# Patient Record
Sex: FEMALE | Race: WHITE | NOT HISPANIC OR LATINO | Employment: UNEMPLOYED | URBAN - NONMETROPOLITAN AREA
[De-identification: names, ages, dates, MRNs, and addresses within clinical notes are randomized per-mention and may not be internally consistent; named-entity substitution may affect disease eponyms.]

---

## 2018-10-17 RX ORDER — HYDROCODONE BITARTRATE AND ACETAMINOPHEN 10; 325 MG/1; MG/1
1 TABLET ORAL
COMMUNITY

## 2018-10-17 RX ORDER — GABAPENTIN 400 MG/1
400 CAPSULE ORAL 2 TIMES DAILY
COMMUNITY

## 2018-10-17 RX ORDER — LOSARTAN POTASSIUM AND HYDROCHLOROTHIAZIDE 12.5; 5 MG/1; MG/1
1 TABLET ORAL DAILY
COMMUNITY

## 2018-10-17 RX ORDER — CHOLECALCIFEROL (VITAMIN D3) 125 MCG
500 CAPSULE ORAL DAILY
COMMUNITY

## 2018-10-17 RX ORDER — ASPIRIN 81 MG/1
81 TABLET ORAL NIGHTLY
COMMUNITY

## 2018-10-18 ENCOUNTER — ANESTHESIA EVENT (OUTPATIENT)
Dept: PERIOP | Facility: HOSPITAL | Age: 53
End: 2018-10-18

## 2018-10-19 ENCOUNTER — HOSPITAL ENCOUNTER (OUTPATIENT)
Facility: HOSPITAL | Age: 53
Setting detail: HOSPITAL OUTPATIENT SURGERY
Discharge: HOME | End: 2018-10-19
Attending: OPHTHALMOLOGY | Admitting: OPHTHALMOLOGY

## 2018-10-19 ENCOUNTER — ANESTHESIA (OUTPATIENT)
Dept: PERIOP | Facility: HOSPITAL | Age: 53
End: 2018-10-19

## 2018-10-19 VITALS
HEIGHT: 64 IN | OXYGEN SATURATION: 95 % | HEART RATE: 84 BPM | WEIGHT: 275.8 LBS | SYSTOLIC BLOOD PRESSURE: 146 MMHG | TEMPERATURE: 97.8 F | BODY MASS INDEX: 47.08 KG/M2 | DIASTOLIC BLOOD PRESSURE: 87 MMHG | RESPIRATION RATE: 18 BRPM

## 2018-10-19 LAB — GLUCOSE BLDC GLUCOMTR-MCNC: 175 MG/DL (ref 70–130)

## 2018-10-19 PROCEDURE — V2632 POST CHMBR INTRAOCULAR LENS: HCPCS | Performed by: OPHTHALMOLOGY

## 2018-10-19 PROCEDURE — 25010000002 FENTANYL CITRATE (PF) 100 MCG/2ML SOLUTION: Performed by: NURSE ANESTHETIST, CERTIFIED REGISTERED

## 2018-10-19 PROCEDURE — 25010000002 VANCOMYCIN 1 G RECONSTITUTED SOLUTION 1 EACH VIAL: Performed by: OPHTHALMOLOGY

## 2018-10-19 PROCEDURE — 25010000002 MIDAZOLAM PER 1 MG: Performed by: NURSE ANESTHETIST, CERTIFIED REGISTERED

## 2018-10-19 PROCEDURE — 25010000002 EPINEPHRINE PER 0.1 MG: Performed by: OPHTHALMOLOGY

## 2018-10-19 PROCEDURE — 82962 GLUCOSE BLOOD TEST: CPT

## 2018-10-19 DEVICE — LENS ACRYSOF IQ 6X13MM SN60WF 20.5: Type: IMPLANTABLE DEVICE | Site: EYE | Status: FUNCTIONAL

## 2018-10-19 RX ORDER — ONDANSETRON 2 MG/ML
4 INJECTION INTRAMUSCULAR; INTRAVENOUS ONCE AS NEEDED
Status: DISCONTINUED | OUTPATIENT
Start: 2018-10-19 | End: 2018-10-19 | Stop reason: HOSPADM

## 2018-10-19 RX ORDER — TETRACAINE HYDROCHLORIDE 5 MG/ML
1 SOLUTION OPHTHALMIC
Status: COMPLETED | OUTPATIENT
Start: 2018-10-19 | End: 2018-10-19

## 2018-10-19 RX ORDER — SODIUM CHLORIDE 0.9 % (FLUSH) 0.9 %
10 SYRINGE (ML) INJECTION AS NEEDED
Status: DISCONTINUED | OUTPATIENT
Start: 2018-10-19 | End: 2018-10-19 | Stop reason: HOSPADM

## 2018-10-19 RX ORDER — MEPERIDINE HYDROCHLORIDE 50 MG/ML
12.5 INJECTION INTRAMUSCULAR; INTRAVENOUS; SUBCUTANEOUS
Status: DISCONTINUED | OUTPATIENT
Start: 2018-10-19 | End: 2018-10-19 | Stop reason: HOSPADM

## 2018-10-19 RX ORDER — PROMETHAZINE HYDROCHLORIDE 25 MG/ML
12.5 INJECTION, SOLUTION INTRAMUSCULAR; INTRAVENOUS ONCE AS NEEDED
Status: DISCONTINUED | OUTPATIENT
Start: 2018-10-19 | End: 2018-10-19 | Stop reason: HOSPADM

## 2018-10-19 RX ORDER — BRIMONIDINE TARTRATE 0.15 %
DROPS OPHTHALMIC (EYE) AS NEEDED
Status: DISCONTINUED | OUTPATIENT
Start: 2018-10-19 | End: 2018-10-19 | Stop reason: HOSPADM

## 2018-10-19 RX ORDER — PROMETHAZINE HYDROCHLORIDE 25 MG/1
25 SUPPOSITORY RECTAL ONCE AS NEEDED
Status: DISCONTINUED | OUTPATIENT
Start: 2018-10-19 | End: 2018-10-19 | Stop reason: HOSPADM

## 2018-10-19 RX ORDER — PHENYLEPHRINE HCL 2.5 %
1 DROPS OPHTHALMIC (EYE)
Status: COMPLETED | OUTPATIENT
Start: 2018-10-19 | End: 2018-10-19

## 2018-10-19 RX ORDER — PROMETHAZINE HYDROCHLORIDE 12.5 MG/1
25 TABLET ORAL ONCE AS NEEDED
Status: DISCONTINUED | OUTPATIENT
Start: 2018-10-19 | End: 2018-10-19 | Stop reason: HOSPADM

## 2018-10-19 RX ORDER — DEXAMETHASONE SODIUM PHOSPHATE 4 MG/ML
8 INJECTION, SOLUTION INTRA-ARTICULAR; INTRALESIONAL; INTRAMUSCULAR; INTRAVENOUS; SOFT TISSUE ONCE AS NEEDED
Status: DISCONTINUED | OUTPATIENT
Start: 2018-10-19 | End: 2018-10-19 | Stop reason: HOSPADM

## 2018-10-19 RX ORDER — CYCLOPENTOLATE HYDROCHLORIDE 10 MG/ML
1 SOLUTION/ DROPS OPHTHALMIC
Status: COMPLETED | OUTPATIENT
Start: 2018-10-19 | End: 2018-10-19

## 2018-10-19 RX ORDER — TETRACAINE HYDROCHLORIDE 5 MG/ML
SOLUTION OPHTHALMIC AS NEEDED
Status: DISCONTINUED | OUTPATIENT
Start: 2018-10-19 | End: 2018-10-19 | Stop reason: HOSPADM

## 2018-10-19 RX ORDER — MIDAZOLAM HYDROCHLORIDE 1 MG/ML
INJECTION INTRAMUSCULAR; INTRAVENOUS AS NEEDED
Status: DISCONTINUED | OUTPATIENT
Start: 2018-10-19 | End: 2018-10-19 | Stop reason: SURG

## 2018-10-19 RX ORDER — FENTANYL CITRATE 50 UG/ML
INJECTION, SOLUTION INTRAMUSCULAR; INTRAVENOUS AS NEEDED
Status: DISCONTINUED | OUTPATIENT
Start: 2018-10-19 | End: 2018-10-19 | Stop reason: SURG

## 2018-10-19 RX ORDER — PREDNISOLONE ACETATE 10 MG/ML
SUSPENSION/ DROPS OPHTHALMIC AS NEEDED
Status: DISCONTINUED | OUTPATIENT
Start: 2018-10-19 | End: 2018-10-19 | Stop reason: HOSPADM

## 2018-10-19 RX ORDER — MOXIFLOXACIN 5 MG/ML
SOLUTION/ DROPS OPHTHALMIC AS NEEDED
Status: DISCONTINUED | OUTPATIENT
Start: 2018-10-19 | End: 2018-10-19 | Stop reason: HOSPADM

## 2018-10-19 RX ADMIN — FENTANYL CITRATE 25 MCG: 50 INJECTION, SOLUTION INTRAMUSCULAR; INTRAVENOUS at 08:12

## 2018-10-19 RX ADMIN — PHENYLEPHRINE HYDROCHLORIDE 1 DROP: 25 SOLUTION/ DROPS OPHTHALMIC at 06:35

## 2018-10-19 RX ADMIN — CYCLOPENTOLATE HYDROCHLORIDE 1 DROP: 10 SOLUTION/ DROPS OPHTHALMIC at 06:35

## 2018-10-19 RX ADMIN — CYCLOPENTOLATE HYDROCHLORIDE 1 DROP: 10 SOLUTION/ DROPS OPHTHALMIC at 06:21

## 2018-10-19 RX ADMIN — Medication 5 ML: at 08:10

## 2018-10-19 RX ADMIN — MIDAZOLAM HYDROCHLORIDE 1 MG: 2 INJECTION, SOLUTION INTRAMUSCULAR; INTRAVENOUS at 08:12

## 2018-10-19 RX ADMIN — TETRACAINE HYDROCHLORIDE 1 DROP: 5 SOLUTION OPHTHALMIC at 06:35

## 2018-10-19 RX ADMIN — CYCLOPENTOLATE HYDROCHLORIDE 1 DROP: 10 SOLUTION/ DROPS OPHTHALMIC at 06:11

## 2018-10-19 RX ADMIN — Medication 10 ML: at 06:19

## 2018-10-19 RX ADMIN — PHENYLEPHRINE HYDROCHLORIDE 1 DROP: 25 SOLUTION/ DROPS OPHTHALMIC at 06:21

## 2018-10-19 RX ADMIN — PHENYLEPHRINE HYDROCHLORIDE 1 DROP: 25 SOLUTION/ DROPS OPHTHALMIC at 06:11

## 2018-10-19 RX ADMIN — TETRACAINE HYDROCHLORIDE 1 DROP: 5 SOLUTION OPHTHALMIC at 06:11

## 2018-10-19 NOTE — ANESTHESIA PREPROCEDURE EVALUATION
Anesthesia Evaluation     no history of anesthetic complications:  NPO Solid Status: > 8 hours  NPO Liquid Status: > 2 hours           Airway   Mallampati: II  TM distance: >3 FB  Neck ROM: full  Possible difficult intubation  Dental    (+) lower dentures and upper dentures    Pulmonary - normal exam    breath sounds clear to auscultation  (+) a smoker (1 ppd) Current Smoked day of surgery,     ROS comment: Cough  snores  Cardiovascular - normal exam  Exercise tolerance: poor (<4 METS)    Rhythm: regular  Rate: normal    (+) hypertension poorly controlled less than 2 medications,   (-) angina      Neuro/Psych  (+) psychiatric history Anxiety,     GI/Hepatic/Renal/Endo    (+) morbid obesity, GERD well controlled,  diabetes mellitus (glu 175) type 2 poorly controlled using insulin,     Musculoskeletal     (+) arthralgias,   Abdominal    Substance History - negative use     OB/GYN    (-)  Pregnant        Other   (+) arthritis (knees)                     Anesthesia Plan    ASA 3     MAC     intravenous induction   Anesthetic plan, all risks, benefits, and alternatives have been provided, discussed and informed consent has been obtained with: spouse/significant other and patient.

## 2018-10-19 NOTE — ANESTHESIA POSTPROCEDURE EVALUATION
Patient: Hiren Christianson    Procedure Summary     Date:  10/19/18 Room / Location:  [facility] OR 06 / [facility] OR    Anesthesia Start:  0812 Anesthesia Stop:  0833    Procedure:  CATARACT PHACO EXTRACTION WITH INTRAOCULAR LENS IMPLANT (Right Eye) Diagnosis:       Age-related nuclear cataract of right eye      (Age-related nuclear cataract of right eye [H25.11])    Surgeon:  Vaughn Lewis MD Provider:  Donnie Batres MD    Anesthesia Type:  MAC ASA Status:  3          Anesthesia Type: MAC  Last vitals  BP   (!) 189/92 (10/19/18 0613)   Temp   97.1 °F (36.2 °C) (10/19/18 0608)   Pulse   94 (10/19/18 0608)   Resp   18 (10/19/18 0608)     SpO2   98 % (10/19/18 0608)     Post Anesthesia Care and Evaluation    Patient location during evaluation: bedside  Patient participation: complete - patient cannot participate  Level of consciousness: awake  Pain score: 0  Pain management: adequate  Airway patency: patent  Anesthetic complications: No anesthetic complications  PONV Status: none  Cardiovascular status: acceptable  Respiratory status: acceptable  Hydration status: acceptable

## 2018-10-25 ENCOUNTER — ANESTHESIA EVENT (OUTPATIENT)
Dept: PERIOP | Facility: HOSPITAL | Age: 53
End: 2018-10-25

## 2018-10-26 ENCOUNTER — HOSPITAL ENCOUNTER (OUTPATIENT)
Facility: HOSPITAL | Age: 53
Setting detail: HOSPITAL OUTPATIENT SURGERY
Discharge: HOME | End: 2018-10-26
Attending: OPHTHALMOLOGY | Admitting: OPHTHALMOLOGY

## 2018-10-26 ENCOUNTER — ANESTHESIA (OUTPATIENT)
Dept: PERIOP | Facility: HOSPITAL | Age: 53
End: 2018-10-26

## 2018-10-26 VITALS
SYSTOLIC BLOOD PRESSURE: 167 MMHG | TEMPERATURE: 97.5 F | OXYGEN SATURATION: 95 % | WEIGHT: 268.74 LBS | DIASTOLIC BLOOD PRESSURE: 99 MMHG | BODY MASS INDEX: 47.62 KG/M2 | HEIGHT: 63 IN | RESPIRATION RATE: 18 BRPM | HEART RATE: 88 BPM

## 2018-10-26 LAB — GLUCOSE BLDC GLUCOMTR-MCNC: 167 MG/DL (ref 70–130)

## 2018-10-26 PROCEDURE — 25010000002 MIDAZOLAM PER 1 MG: Performed by: NURSE ANESTHETIST, CERTIFIED REGISTERED

## 2018-10-26 PROCEDURE — 25010000002 EPINEPHRINE PER 0.1 MG: Performed by: OPHTHALMOLOGY

## 2018-10-26 PROCEDURE — V2632 POST CHMBR INTRAOCULAR LENS: HCPCS | Performed by: OPHTHALMOLOGY

## 2018-10-26 PROCEDURE — 25010000002 VANCOMYCIN 1 G RECONSTITUTED SOLUTION 1 EACH VIAL: Performed by: OPHTHALMOLOGY

## 2018-10-26 PROCEDURE — 25010000002 FENTANYL CITRATE (PF) 100 MCG/2ML SOLUTION: Performed by: NURSE ANESTHETIST, CERTIFIED REGISTERED

## 2018-10-26 PROCEDURE — 82962 GLUCOSE BLOOD TEST: CPT

## 2018-10-26 DEVICE — LENS ACRYSOF IQ 6X13MM SN60WF 20.5: Type: IMPLANTABLE DEVICE | Site: EYE | Status: FUNCTIONAL

## 2018-10-26 RX ORDER — MOXIFLOXACIN 5 MG/ML
SOLUTION/ DROPS OPHTHALMIC AS NEEDED
Status: DISCONTINUED | OUTPATIENT
Start: 2018-10-26 | End: 2018-10-26 | Stop reason: HOSPADM

## 2018-10-26 RX ORDER — FLUMAZENIL 0.1 MG/ML
0.2 INJECTION INTRAVENOUS AS NEEDED
Status: DISCONTINUED | OUTPATIENT
Start: 2018-10-26 | End: 2018-10-26 | Stop reason: HOSPADM

## 2018-10-26 RX ORDER — DIPHENHYDRAMINE HYDROCHLORIDE 50 MG/ML
12.5 INJECTION INTRAMUSCULAR; INTRAVENOUS
Status: DISCONTINUED | OUTPATIENT
Start: 2018-10-26 | End: 2018-10-26 | Stop reason: HOSPADM

## 2018-10-26 RX ORDER — ACETAMINOPHEN 650 MG/1
650 SUPPOSITORY RECTAL ONCE AS NEEDED
Status: DISCONTINUED | OUTPATIENT
Start: 2018-10-26 | End: 2018-10-26 | Stop reason: HOSPADM

## 2018-10-26 RX ORDER — LABETALOL HYDROCHLORIDE 5 MG/ML
5 INJECTION, SOLUTION INTRAVENOUS
Status: DISCONTINUED | OUTPATIENT
Start: 2018-10-26 | End: 2018-10-26 | Stop reason: HOSPADM

## 2018-10-26 RX ORDER — FENTANYL CITRATE 50 UG/ML
INJECTION, SOLUTION INTRAMUSCULAR; INTRAVENOUS AS NEEDED
Status: DISCONTINUED | OUTPATIENT
Start: 2018-10-26 | End: 2018-10-26 | Stop reason: SURG

## 2018-10-26 RX ORDER — SODIUM CHLORIDE 0.9 % (FLUSH) 0.9 %
10 SYRINGE (ML) INJECTION AS NEEDED
Status: DISCONTINUED | OUTPATIENT
Start: 2018-10-26 | End: 2018-10-26 | Stop reason: HOSPADM

## 2018-10-26 RX ORDER — EPHEDRINE SULFATE 50 MG/ML
5 INJECTION, SOLUTION INTRAVENOUS ONCE AS NEEDED
Status: DISCONTINUED | OUTPATIENT
Start: 2018-10-26 | End: 2018-10-26 | Stop reason: HOSPADM

## 2018-10-26 RX ORDER — NALOXONE HCL 0.4 MG/ML
0.2 VIAL (ML) INJECTION AS NEEDED
Status: DISCONTINUED | OUTPATIENT
Start: 2018-10-26 | End: 2018-10-26 | Stop reason: HOSPADM

## 2018-10-26 RX ORDER — ACETAMINOPHEN 325 MG/1
650 TABLET ORAL ONCE AS NEEDED
Status: DISCONTINUED | OUTPATIENT
Start: 2018-10-26 | End: 2018-10-26 | Stop reason: HOSPADM

## 2018-10-26 RX ORDER — CYCLOPENTOLATE HYDROCHLORIDE 10 MG/ML
1 SOLUTION/ DROPS OPHTHALMIC
Status: COMPLETED | OUTPATIENT
Start: 2018-10-26 | End: 2018-10-26

## 2018-10-26 RX ORDER — MIDAZOLAM HYDROCHLORIDE 1 MG/ML
INJECTION INTRAMUSCULAR; INTRAVENOUS AS NEEDED
Status: DISCONTINUED | OUTPATIENT
Start: 2018-10-26 | End: 2018-10-26 | Stop reason: SURG

## 2018-10-26 RX ORDER — PREDNISOLONE ACETATE 10 MG/ML
SUSPENSION/ DROPS OPHTHALMIC AS NEEDED
Status: DISCONTINUED | OUTPATIENT
Start: 2018-10-26 | End: 2018-10-26 | Stop reason: HOSPADM

## 2018-10-26 RX ORDER — TETRACAINE HYDROCHLORIDE 5 MG/ML
SOLUTION OPHTHALMIC AS NEEDED
Status: DISCONTINUED | OUTPATIENT
Start: 2018-10-26 | End: 2018-10-26 | Stop reason: HOSPADM

## 2018-10-26 RX ORDER — ONDANSETRON 2 MG/ML
4 INJECTION INTRAMUSCULAR; INTRAVENOUS ONCE AS NEEDED
Status: DISCONTINUED | OUTPATIENT
Start: 2018-10-26 | End: 2018-10-26 | Stop reason: HOSPADM

## 2018-10-26 RX ORDER — TETRACAINE HYDROCHLORIDE 5 MG/ML
1 SOLUTION OPHTHALMIC
Status: COMPLETED | OUTPATIENT
Start: 2018-10-26 | End: 2018-10-26

## 2018-10-26 RX ORDER — BRIMONIDINE TARTRATE 0.15 %
DROPS OPHTHALMIC (EYE) AS NEEDED
Status: DISCONTINUED | OUTPATIENT
Start: 2018-10-26 | End: 2018-10-26 | Stop reason: HOSPADM

## 2018-10-26 RX ORDER — PHENYLEPHRINE HCL 2.5 %
1 DROPS OPHTHALMIC (EYE)
Status: COMPLETED | OUTPATIENT
Start: 2018-10-26 | End: 2018-10-26

## 2018-10-26 RX ADMIN — PHENYLEPHRINE HYDROCHLORIDE 1 DROP: 25 SOLUTION/ DROPS OPHTHALMIC at 05:58

## 2018-10-26 RX ADMIN — FENTANYL CITRATE 25 MCG: 50 INJECTION, SOLUTION INTRAMUSCULAR; INTRAVENOUS at 07:50

## 2018-10-26 RX ADMIN — PHENYLEPHRINE HYDROCHLORIDE 1 DROP: 25 SOLUTION/ DROPS OPHTHALMIC at 05:48

## 2018-10-26 RX ADMIN — PHENYLEPHRINE HYDROCHLORIDE 1 DROP: 25 SOLUTION/ DROPS OPHTHALMIC at 05:38

## 2018-10-26 RX ADMIN — MIDAZOLAM HYDROCHLORIDE 1 MG: 2 INJECTION, SOLUTION INTRAMUSCULAR; INTRAVENOUS at 07:44

## 2018-10-26 RX ADMIN — CYCLOPENTOLATE HYDROCHLORIDE 1 DROP: 10 SOLUTION/ DROPS OPHTHALMIC at 05:48

## 2018-10-26 RX ADMIN — TETRACAINE HYDROCHLORIDE 1 DROP: 5 SOLUTION OPHTHALMIC at 05:38

## 2018-10-26 RX ADMIN — CYCLOPENTOLATE HYDROCHLORIDE 1 DROP: 10 SOLUTION/ DROPS OPHTHALMIC at 05:58

## 2018-10-26 RX ADMIN — TETRACAINE HYDROCHLORIDE 1 DROP: 5 SOLUTION OPHTHALMIC at 06:58

## 2018-10-26 RX ADMIN — CYCLOPENTOLATE HYDROCHLORIDE 1 DROP: 10 SOLUTION/ DROPS OPHTHALMIC at 05:38

## 2018-10-26 RX ADMIN — MIDAZOLAM HYDROCHLORIDE 1 MG: 2 INJECTION, SOLUTION INTRAMUSCULAR; INTRAVENOUS at 07:48

## 2018-10-26 RX ADMIN — Medication 10 ML: at 05:46

## 2018-10-26 NOTE — ANESTHESIA POSTPROCEDURE EVALUATION
Patient: Hiren Christianson    Procedure Summary     Date:  10/26/18 Room / Location:  [facility] OR 06 / [facility] OR    Anesthesia Start:  0744 Anesthesia Stop:  0804    Procedure:  CATARACT PHACO EXTRACTION WITH INTRAOCULAR LENS IMPLANT (Left Eye) Diagnosis:       Age-related nuclear cataract of left eye      (Age-related nuclear cataract of left eye [H25.12])    Surgeon:  Vaughn Lewis MD Provider:  Gregory Sherman MD    Anesthesia Type:  MAC ASA Status:  3          Anesthesia Type: MAC  Last vitals  BP   135/98 (10/26/18 0538)   Temp   97.3 °F (36.3 °C) (10/26/18 0538)   Pulse   88 (10/26/18 0538)   Resp   18 (10/26/18 0538)     SpO2   95 % (10/26/18 0538)     Post Anesthesia Care and Evaluation    Patient location during evaluation: PACU  Patient participation: complete - patient participated  Level of consciousness: awake and alert  Pain score: 0  Pain management: adequate  Airway patency: patent  Anesthetic complications: No anesthetic complications  PONV Status: none  Cardiovascular status: acceptable  Respiratory status: acceptable  Hydration status: acceptable  Post Neuraxial Block status: Motor and sensory function returned to baseline

## 2021-06-11 ENCOUNTER — OFFICE VISIT (OUTPATIENT)
Dept: ENDOCRINOLOGY | Facility: CLINIC | Age: 56
End: 2021-06-11

## 2021-06-11 VITALS
OXYGEN SATURATION: 97 % | HEART RATE: 118 BPM | BODY MASS INDEX: 41.73 KG/M2 | HEIGHT: 64 IN | WEIGHT: 244.4 LBS | SYSTOLIC BLOOD PRESSURE: 134 MMHG | DIASTOLIC BLOOD PRESSURE: 88 MMHG

## 2021-06-11 DIAGNOSIS — Z79.4 TYPE 2 DIABETES MELLITUS WITH HYPERGLYCEMIA, WITH LONG-TERM CURRENT USE OF INSULIN (HCC): Primary | ICD-10-CM

## 2021-06-11 DIAGNOSIS — F17.200 SMOKING: ICD-10-CM

## 2021-06-11 DIAGNOSIS — E11.42 DIABETIC POLYNEUROPATHY ASSOCIATED WITH TYPE 2 DIABETES MELLITUS (HCC): ICD-10-CM

## 2021-06-11 DIAGNOSIS — E11.65 TYPE 2 DIABETES MELLITUS WITH HYPERGLYCEMIA, WITH LONG-TERM CURRENT USE OF INSULIN (HCC): Primary | ICD-10-CM

## 2021-06-11 DIAGNOSIS — E66.01 CLASS 3 SEVERE OBESITY WITH BODY MASS INDEX (BMI) OF 40.0 TO 44.9 IN ADULT, UNSPECIFIED OBESITY TYPE, UNSPECIFIED WHETHER SERIOUS COMORBIDITY PRESENT (HCC): ICD-10-CM

## 2021-06-11 PROCEDURE — 99204 OFFICE O/P NEW MOD 45 MIN: CPT | Performed by: NURSE PRACTITIONER

## 2021-06-11 RX ORDER — BUSPIRONE HYDROCHLORIDE 10 MG/1
1 TABLET ORAL DAILY
COMMUNITY
Start: 2021-03-15

## 2021-06-11 RX ORDER — INSULIN HUMAN 500 [IU]/ML
INJECTION, SOLUTION SUBCUTANEOUS
Qty: 4 PEN | Refills: 11 | Status: SHIPPED | OUTPATIENT
Start: 2021-06-11 | End: 2022-03-25

## 2021-06-11 RX ORDER — PROCHLORPERAZINE 25 MG/1
1 SUPPOSITORY RECTAL AS NEEDED
Qty: 9 EACH | Refills: 3 | Status: SHIPPED | OUTPATIENT
Start: 2021-06-11 | End: 2022-05-16

## 2021-06-11 RX ORDER — PROCHLORPERAZINE 25 MG/1
1 SUPPOSITORY RECTAL
Qty: 1 EACH | Refills: 3 | Status: SHIPPED | OUTPATIENT
Start: 2021-06-11 | End: 2022-06-20

## 2021-06-11 RX ORDER — PROCHLORPERAZINE 25 MG/1
1 SUPPOSITORY RECTAL ONCE
Qty: 1 EACH | Refills: 1 | Status: SHIPPED | OUTPATIENT
Start: 2021-06-11 | End: 2021-09-09

## 2021-06-11 RX ORDER — HYDROXYZINE HYDROCHLORIDE 25 MG/1
1 TABLET, FILM COATED ORAL DAILY
COMMUNITY
Start: 2021-05-10

## 2021-06-11 RX ORDER — FLUTICASONE PROPIONATE 50 MCG
2 SPRAY, SUSPENSION (ML) NASAL DAILY
COMMUNITY

## 2021-06-11 RX ORDER — ONDANSETRON 4 MG/1
4 TABLET, ORALLY DISINTEGRATING ORAL EVERY 8 HOURS PRN
Qty: 20 TABLET | Refills: 11 | Status: SHIPPED | OUTPATIENT
Start: 2021-06-11 | End: 2022-03-21

## 2021-06-11 NOTE — PATIENT INSTRUCTIONS
aking Humulin U 500 --- 60 units at night stop        Taking Admelog 60 up to 70 units TID before each meal --stop      Change to the followiing     Humulin U 500    Take 90 units 30 minutes before breakfast    If you have a low during the day decrease by 10 units     Take 60 units 30 minutes before supper    If you have a low over night decrease the supper dose by 10 units       Keep you Admelog for rescue    Sliding scale     200-250  Give 3 units   251-300 Give 4 units   301 to 350 Give 5 units  Above 351  Give 6 units     Start Trulicity 0.75 mg once weekly     Side effects can be nausea    If nauseated while eating , the medication is saying stop eating     If vomiting or abdominal pain stop medication

## 2021-06-11 NOTE — PROGRESS NOTES
"Chief Complaint  Diabetes    Subjective          Hiren Christianson presents to [facility] ENDOCRINOLOGY  History of Present Illness      In office visit        Referring provider  =Roel     56-year-old female presents for consultation    REASON -- diabetes mellitus type 2    Duration--- diagnosed in 2005     Context --felt bad and was sleeping all the time     Timing -- constant     Quality not controlled    Severity  High     Quantity     Lab Results   Component Value Date    HGBA1C 9.8 (H) 02/20/2021         Macrovascular complications--- No CAD, no PAD, no CVA     Microvascular complications ---neuropathy , no DR, no renal disease     Current diabetes regimen --oral medications, insulin     Current glucose monitoring     Fingerstick 4 times daily       Am readings 200 up to 300     Daytime 200 and up 300     Up to 362             Current diet--regular     Exercise none       Review of Systems - General ROS: positive for  - fatigue        Objective   Vital Signs:   /88   Pulse 118   Ht 161.3 cm (63.5\")   Wt 111 kg (244 lb 6.4 oz)   SpO2 97%   BMI 42.61 kg/m²     Physical Exam  Constitutional:       Appearance: Normal appearance.   Cardiovascular:      Rate and Rhythm: Regular rhythm.      Heart sounds: Normal heart sounds.   Pulmonary:      Breath sounds: Normal breath sounds.   Musculoskeletal:         General: Normal range of motion.      Cervical back: Normal range of motion.   Skin:     Coloration: Skin is not pale.   Neurological:      General: No focal deficit present.      Mental Status: She is alert.   Psychiatric:         Mood and Affect: Mood normal.         Thought Content: Thought content normal.         Judgment: Judgment normal.        Result Review :   The following data was reviewed by: JAMESON Price on 06/11/2021:  Common labs    Common Labsle 8/21/20 11/23/20 11/23/20 2/20/21     0911 0911    Glucose  297 (A)     Hemoglobin A1C 9.4 (A)  9.7 (A) 9.8 " (A)   (A) Abnormal value       Comments are available for some flowsheets but are not being displayed.                     Assessment and Plan    Diagnoses and all orders for this visit:    1. Type 2 diabetes mellitus with hyperglycemia, with long-term current use of insulin (CMS/[facility]) (Primary)    2. Smoking    3. Diabetic polyneuropathy associated with type 2 diabetes mellitus (CMS/[facility])    4. Class 3 severe obesity with body mass index (BMI) of 40.0 to 44.9 in adult, unspecified obesity type, unspecified whether serious comorbidity present (CMS/[facility])    Other orders  -     Continuous Blood Gluc Transmit (Dexcom G6 Transmitter) misc; 1 each Every 3 (Three) Months.  Dispense: 1 each; Refill: 3  -     Continuous Blood Gluc Sensor (Dexcom G6 Sensor); 1 each As Needed (glucose control). Every 10 days  Dispense: 9 each; Refill: 3  -     Continuous Blood Gluc  (Dexcom G6 ) device; 1 each 1 (One) Time for 1 dose.  Dispense: 1 each; Refill: 1  -     Dulaglutide 0.75 MG/0.5ML solution pen-injector; Inject 0.75 mg under the skin into the appropriate area as directed 1 (One) Time Per Week.  Dispense: 4 pen; Refill: 11  -     ondansetron ODT (Zofran ODT) 4 MG disintegrating tablet; Place 1 tablet on the tongue Every 8 (Eight) Hours As Needed for Nausea or Vomiting.  Dispense: 20 tablet; Refill: 11  -     Insulin Regular Human, Conc, (HumuLIN R U-500 KwikPen) 500 UNIT/ML solution pen-injector CONCENTRATED injection; 90 units 30 min qac bkfast and 60 units 30 min qac supper but may increase to 120 u w bkfast and 80 u w supper.  Dispense: 4 pen; Refill: 11         Glycemic Management:    Diabetes mellitus type 2    Lab Results   Component Value Date    HGBA1C 9.8 (H) 02/20/2021     Metformin 500 mg BID not taking --restart       States lantus did not work         Taking Humulin U 500 --- 60 units at night stop        Taking Admelog 60 up to 70 units TID before each meal --stop      Change to the followiing      Humulin U 500    Take 90 units 30 minutes before breakfast    If you have a low during the day decrease by 10 units     Take 60 units 30 minutes before supper    If you have a low over night decrease the supper dose by 10 units       Keep you Admelog for rescue    Sliding scale     200-250  Give 3 units   251-300 Give 4 units   301 to 350 Give 5 units  Above 351  Give 6 units     Start Trulicity 0.75 mg once weekly     Side effects can be nausea    If nauseated while eating , the medication is saying stop eating     If vomiting or abdominal pain stop medication       Approve Dexcom G6     The patient has diabetes mellitus, insulin-dependent.     Our Diabetes Department has evaluated the patient in the last six months and will continue counseling on insulin adjustment.      The patient performs blood glucose testing at least four times daily with proven glucose variability from 50 to 300 mg per dl.     The patient is administering basal insulin and prandial insulin four times per day for more than six months.     The patient uses a home blood glucose monitor to assess blood glucose at least four times daily for more than six months.     The patient requires frequent adjustment of insulin treatment regimen based on blood glucose readings.     The patient has frequent variability in blood glucose readings due to activity and variability in meal content and time.      The patient has completed a diabetes education program with us.     The patient has demonstrated the ability to self-monitor her glucose.      The patient is motivated in improving diabetes control      The patient has hypoglycemia unawareness       Microvascular Complications Monitoring       Last eye exam----- Dec. 2020, no DR     Neuropathy --yes     Taking gabapentin 400 mg tid         Lipid Management:     Not on statin      Blood Pressure Management:      Taking Hyzaar 50-12.5 mg daily         Thyroid Health    Lab Results   Component Value Date     TSH 1.48 05/08/2019         Bone Health     Lab Results   Component Value Date    CALCIUM 9.5 04/04/2018           Weight Management:    Patient's Body mass index is 42.61 kg/m². indicating that she is obese (BMI >30). Obesity-related health conditions include the following: diabetes mellitus. Obesity is unchanged. BMI is is above average; no BMI management plan is appropriate. We discussed portion control and increasing exercise..      Preventive Care:     Smoker        Hiren Christianson  reports that she has been smoking. She has been smoking about 1.00 pack per day. She has never used smokeless tobacco.. I have educated her on the risk of diseases from using tobacco products such as cancer, COPD and heart disease.     I advised her to quit and she is not willing to quit.    I spent 3  minutes counseling the patient.    Records received from Dr. Reed  from 2021    Thank you for this consultation          Follow Up   Return in about 3 weeks (around 7/2/2021) for Recheck.  Patient was given instructions and counseling regarding her condition or for health maintenance advice. Please see specific information pulled into the AVS if appropriate.

## 2021-06-15 ENCOUNTER — DOCUMENTATION (OUTPATIENT)
Dept: ENDOCRINOLOGY | Facility: CLINIC | Age: 56
End: 2021-06-15

## 2021-06-15 NOTE — PROGRESS NOTES
Hiren Christianson is a 56 y.o. female seen by diabetes educator on 06/15/2021 for Dexcom G6 training.      Reviewed components of Dexcom system. Reviewed how to set up .  set up in office today.      Explained low and high blood glucose alerts, as well as rise and fall rates. Instructed patient on how to view trend graph.     Reviewed how to enter sensor code into  with each sensor change. Patient did this in office.    Instructed patient to change sensor every 10 days to prevent skin irritation or infection. Advised patient to see primary physician if signs of skin infection develop.     Instructed patient on how to properly insert sensor including: skin prep, site selection, site rotation, sensor placement, and how to use insertion device. Patient inserted sensor in office today.      Instructed patient on how to insert transmitter serial number into . Explained battery life of transmitter. Will need to change transmitter every 3 months. Discussed how to enter new serial number with each new transmitter.  will then pair with each new transmitter. Pairing done today.      Instructed patient to not wear sensor or transmitter in xray, MRI, or CT Scan.     Discussed signal loss alert and how to troubleshoot.      Advised patient to call Dexcom Customer Support if has a failed sensor or if has technical questions regarding CGM. Instructed patient on reordering sensor supplies. Patient receives these from [facility].    Jazmyn Weber, SARAN, RN, [facility]  Diabetes Educator

## 2021-06-25 ENCOUNTER — TELEPHONE (OUTPATIENT)
Dept: ENDOCRINOLOGY | Facility: CLINIC | Age: 56
End: 2021-06-25

## 2021-07-02 ENCOUNTER — LAB (OUTPATIENT)
Dept: LAB | Facility: HOSPITAL | Age: 56
End: 2021-07-02

## 2021-07-02 ENCOUNTER — OFFICE VISIT (OUTPATIENT)
Dept: ENDOCRINOLOGY | Facility: CLINIC | Age: 56
End: 2021-07-02

## 2021-07-02 VITALS
BODY MASS INDEX: 42.49 KG/M2 | HEART RATE: 79 BPM | OXYGEN SATURATION: 96 % | DIASTOLIC BLOOD PRESSURE: 76 MMHG | HEIGHT: 64 IN | WEIGHT: 248.9 LBS | SYSTOLIC BLOOD PRESSURE: 120 MMHG

## 2021-07-02 DIAGNOSIS — F17.200 SMOKING: ICD-10-CM

## 2021-07-02 DIAGNOSIS — E11.42 DIABETIC POLYNEUROPATHY ASSOCIATED WITH TYPE 2 DIABETES MELLITUS (HCC): ICD-10-CM

## 2021-07-02 DIAGNOSIS — E11.649 TYPE 2 DIABETES MELLITUS WITH HYPOGLYCEMIA WITHOUT COMA, WITH LONG-TERM CURRENT USE OF INSULIN (HCC): Primary | ICD-10-CM

## 2021-07-02 DIAGNOSIS — Z79.4 TYPE 2 DIABETES MELLITUS WITH HYPOGLYCEMIA WITHOUT COMA, WITH LONG-TERM CURRENT USE OF INSULIN (HCC): Primary | ICD-10-CM

## 2021-07-02 DIAGNOSIS — E66.01 CLASS 3 SEVERE OBESITY WITH BODY MASS INDEX (BMI) OF 40.0 TO 44.9 IN ADULT, UNSPECIFIED OBESITY TYPE, UNSPECIFIED WHETHER SERIOUS COMORBIDITY PRESENT (HCC): ICD-10-CM

## 2021-07-02 PROCEDURE — 95251 CONT GLUC MNTR ANALYSIS I&R: CPT | Performed by: NURSE PRACTITIONER

## 2021-07-02 PROCEDURE — 83036 HEMOGLOBIN GLYCOSYLATED A1C: CPT | Performed by: NURSE PRACTITIONER

## 2021-07-02 PROCEDURE — 36415 COLL VENOUS BLD VENIPUNCTURE: CPT | Performed by: NURSE PRACTITIONER

## 2021-07-02 PROCEDURE — 99214 OFFICE O/P EST MOD 30 MIN: CPT | Performed by: NURSE PRACTITIONER

## 2021-07-02 PROCEDURE — 80050 GENERAL HEALTH PANEL: CPT | Performed by: NURSE PRACTITIONER

## 2021-07-02 NOTE — PROGRESS NOTES
"Chief Complaint  Diabetes    Subjective          Hiren Christianson presents to [facility] ENDOCRINOLOGY  History of Present Illness     In office visit      Referring provider  =Roel      56-year-old female presents for follow up     Reason diabetes mellitus type 2    Timing constant    Quality improved control    Duration diagnosed in 2005    Context diagnosed after presenting for feeling bad and sleeping all the time        Severity  High      Quantity      Lab Results   Component Value Date     HGBA1C 9.8 (H) 02/20/2021            Macrovascular complications--- No CAD, no PAD, no CVA      Microvascular complications ---neuropathy , no DR, no renal disease      Current diabetes regimen --oral medications, insulin      Current glucose monitoring      Fingerstick 4 times daily for Dexcom patient now has Dexcom        Dexcom G6 downloaded and reviewed    Dated from June 19 to July 2, 2021    Average     Time in target 88%    High 11%    Very high less than 1%    Low less than 1%    Very low 0%                  Current diet--regular      Exercise none     Review of Systems - General ROS: negative          Objective   Vital Signs:   /76   Pulse 79   Ht 161.3 cm (63.5\")   Wt 113 kg (248 lb 14.4 oz)   SpO2 96%   BMI 43.40 kg/m²     Physical Exam  Cardiovascular:      Rate and Rhythm: Regular rhythm.      Heart sounds: Normal heart sounds.   Pulmonary:      Breath sounds: Normal breath sounds.   Musculoskeletal:         General: Normal range of motion.      Cervical back: Normal range of motion.   Skin:     Coloration: Skin is not pale.   Neurological:      General: No focal deficit present.      Mental Status: She is alert.   Psychiatric:         Mood and Affect: Mood normal.         Thought Content: Thought content normal.         Judgment: Judgment normal.        Result Review :   The following data was reviewed by: JAMESON Price on 07/02/2021:  Common labs    Common " Labsle 8/21/20 11/23/20 11/23/20 2/20/21     0911 0911    Glucose  297 (A)     Hemoglobin A1C 9.4 (A)  9.7 (A) 9.8 (A)   (A) Abnormal value       Comments are available for some flowsheets but are not being displayed.                     Assessment and Plan    Diagnoses and all orders for this visit:    1. Type 2 diabetes mellitus with hypoglycemia without coma, with long-term current use of insulin (CMS/[facility]) (Primary)  -     CBC & Differential  -     Comprehensive Metabolic Panel  -     Hemoglobin A1c  -     TSH    2. Diabetic polyneuropathy associated with type 2 diabetes mellitus (CMS/[facility])  -     CBC & Differential  -     Comprehensive Metabolic Panel  -     Hemoglobin A1c  -     TSH    3. Smoking    4. Class 3 severe obesity with body mass index (BMI) of 40.0 to 44.9 in adult, unspecified obesity type, unspecified whether serious comorbidity present (CMS/[facility])             Glycemic Management:     Diabetes mellitus type 2           Lab Results   Component Value Date     HGBA1C 9.8 (H) 02/20/2021          Dexcom G6 downloaded and reviewed    Dated from June 19 to July 2, 2021    Average     Time in target 88%    High 11%    Very high less than 1%    Low less than 1%    Very low 0%      Hypoglycemia over night         Decrease insulin     Metformin 500 mg BID taking         States lantus did not work           Taking Humulin U 500     Take 45  units 30 minutes before breakfast---decrease  to 40 units      If you have a low during the day decrease by 10 units      Take 60 units 30 minutes before supper---decrease to 55 units      If you have a low over night decrease the supper dose by 10 units         Keep you Admelog for rescue---only used once      Sliding scale      200-250           Give 3 units   251-300           Give 4 units   301 to 350       Give 5 units  Above 351       Give 6 units       Trulicity 0.75 mg once weekly taking      Side effects can be nausea     If nauseated while eating , the  medication is saying stop eating      If vomiting or abdominal pain stop medication         Previous regimen         Taking Admelog 60 up to 70 units TID before each meal --stop     Microvascular Complications Monitoring         Last eye exam----- Dec. 2020, no DR      Neuropathy --yes      Taking gabapentin 400 mg tid            Lipid Management:      Not on statin        Blood Pressure Management:        Taking Hyzaar 50-12.5 mg daily            Thyroid Health           Lab Results   Component Value Date     TSH 1.48 05/08/2019            Bone Health      Lab Results   Component Value Date    CALCIUM 9.5 04/04/2018             Weight Management:     Patient's Body mass index is 43.4 kg/m². indicating that she is morbidly obese (BMI > 40 or > 35 with obesity - related health condition). Obesity-related health conditions include the following: diabetes mellitus. Obesity is unchanged. BMI is is above average; no BMI management plan is appropriate. We discussed portion control and increasing exercise..       Preventive Care:      Smoker        Hiren Christianson  reports that she has been smoking. She has been smoking about 1.00 pack per day. She has never used smokeless tobacco.. I have educated her on the risk of diseases from using tobacco products such as cancer, COPD and heart disease.     I advised her to quit and she is not willing to quit.    I spent 3  minutes counseling the patient.                Follow Up   Return in about 8 weeks (around 8/27/2021).  Patient was given instructions and counseling regarding her condition or for health maintenance advice. Please see specific information pulled into the AVS if appropriate.

## 2021-07-03 LAB
ALBUMIN SERPL-MCNC: 4.3 G/DL (ref 3.5–5.2)
ALBUMIN/GLOB SERPL: 1.7 G/DL
ALP SERPL-CCNC: 105 U/L (ref 39–117)
ALT SERPL W P-5'-P-CCNC: 14 U/L (ref 1–33)
ANION GAP SERPL CALCULATED.3IONS-SCNC: 11 MMOL/L (ref 5–15)
AST SERPL-CCNC: 17 U/L (ref 1–32)
BASOPHILS # BLD AUTO: 0.05 10*3/MM3 (ref 0–0.2)
BASOPHILS NFR BLD AUTO: 0.7 % (ref 0–1.5)
BILIRUB SERPL-MCNC: 0.2 MG/DL (ref 0–1.2)
BUN SERPL-MCNC: 11 MG/DL (ref 6–20)
BUN/CREAT SERPL: 14.9 (ref 7–25)
CALCIUM SPEC-SCNC: 9.1 MG/DL (ref 8.6–10.5)
CHLORIDE SERPL-SCNC: 100 MMOL/L (ref 98–107)
CO2 SERPL-SCNC: 26 MMOL/L (ref 22–29)
CREAT SERPL-MCNC: 0.74 MG/DL (ref 0.57–1)
DEPRECATED RDW RBC AUTO: 37.3 FL (ref 37–54)
EOSINOPHIL # BLD AUTO: 0.22 10*3/MM3 (ref 0–0.4)
EOSINOPHIL NFR BLD AUTO: 2.9 % (ref 0.3–6.2)
ERYTHROCYTE [DISTWIDTH] IN BLOOD BY AUTOMATED COUNT: 13.4 % (ref 12.3–15.4)
GFR SERPL CREATININE-BSD FRML MDRD: 81 ML/MIN/1.73
GLOBULIN UR ELPH-MCNC: 2.6 GM/DL
GLUCOSE SERPL-MCNC: 81 MG/DL (ref 65–99)
HBA1C MFR BLD: 9.8 % (ref 4.8–5.6)
HCT VFR BLD AUTO: 48.9 % (ref 34–46.6)
HGB BLD-MCNC: 16.7 G/DL (ref 12–15.9)
IMM GRANULOCYTES # BLD AUTO: 0.04 10*3/MM3 (ref 0–0.05)
IMM GRANULOCYTES NFR BLD AUTO: 0.5 % (ref 0–0.5)
LYMPHOCYTES # BLD AUTO: 2.52 10*3/MM3 (ref 0.7–3.1)
LYMPHOCYTES NFR BLD AUTO: 33.2 % (ref 19.6–45.3)
MCH RBC QN AUTO: 27.4 PG (ref 26.6–33)
MCHC RBC AUTO-ENTMCNC: 34.2 G/DL (ref 31.5–35.7)
MCV RBC AUTO: 80.2 FL (ref 79–97)
MONOCYTES # BLD AUTO: 0.5 10*3/MM3 (ref 0.1–0.9)
MONOCYTES NFR BLD AUTO: 6.6 % (ref 5–12)
NEUTROPHILS NFR BLD AUTO: 4.25 10*3/MM3 (ref 1.7–7)
NEUTROPHILS NFR BLD AUTO: 56.1 % (ref 42.7–76)
NRBC BLD AUTO-RTO: 0 /100 WBC (ref 0–0.2)
PLATELET # BLD AUTO: 379 10*3/MM3 (ref 140–450)
PMV BLD AUTO: 9.9 FL (ref 6–12)
POTASSIUM SERPL-SCNC: 4.1 MMOL/L (ref 3.5–5.2)
PROT SERPL-MCNC: 6.9 G/DL (ref 6–8.5)
RBC # BLD AUTO: 6.1 10*6/MM3 (ref 3.77–5.28)
SODIUM SERPL-SCNC: 137 MMOL/L (ref 136–145)
TSH SERPL DL<=0.05 MIU/L-ACNC: 1.21 UIU/ML (ref 0.27–4.2)
WBC # BLD AUTO: 7.58 10*3/MM3 (ref 3.4–10.8)

## 2021-08-27 ENCOUNTER — OFFICE VISIT (OUTPATIENT)
Dept: ENDOCRINOLOGY | Facility: CLINIC | Age: 56
End: 2021-08-27

## 2021-08-27 VITALS
BODY MASS INDEX: 44.73 KG/M2 | WEIGHT: 262 LBS | HEART RATE: 101 BPM | HEIGHT: 64 IN | DIASTOLIC BLOOD PRESSURE: 84 MMHG | OXYGEN SATURATION: 96 % | SYSTOLIC BLOOD PRESSURE: 154 MMHG

## 2021-08-27 DIAGNOSIS — E11.42 DIABETIC POLYNEUROPATHY ASSOCIATED WITH TYPE 2 DIABETES MELLITUS (HCC): ICD-10-CM

## 2021-08-27 DIAGNOSIS — E11.649 TYPE 2 DIABETES MELLITUS WITH HYPOGLYCEMIA WITHOUT COMA, WITH LONG-TERM CURRENT USE OF INSULIN (HCC): Primary | ICD-10-CM

## 2021-08-27 DIAGNOSIS — Z79.4 TYPE 2 DIABETES MELLITUS WITH HYPOGLYCEMIA WITHOUT COMA, WITH LONG-TERM CURRENT USE OF INSULIN (HCC): Primary | ICD-10-CM

## 2021-08-27 DIAGNOSIS — F17.200 SMOKING: ICD-10-CM

## 2021-08-27 DIAGNOSIS — E66.01 CLASS 3 SEVERE OBESITY WITH BODY MASS INDEX (BMI) OF 40.0 TO 44.9 IN ADULT, UNSPECIFIED OBESITY TYPE, UNSPECIFIED WHETHER SERIOUS COMORBIDITY PRESENT (HCC): ICD-10-CM

## 2021-08-27 PROCEDURE — 95251 CONT GLUC MNTR ANALYSIS I&R: CPT | Performed by: NURSE PRACTITIONER

## 2021-08-27 PROCEDURE — 99214 OFFICE O/P EST MOD 30 MIN: CPT | Performed by: NURSE PRACTITIONER

## 2021-09-09 RX ORDER — PROCHLORPERAZINE 25 MG/1
1 SUPPOSITORY RECTAL ONCE
Qty: 1 EACH | Refills: 1 | Status: SHIPPED | OUTPATIENT
Start: 2021-09-09 | End: 2021-09-09

## 2021-12-03 ENCOUNTER — TELEMEDICINE (OUTPATIENT)
Dept: ENDOCRINOLOGY | Facility: CLINIC | Age: 56
End: 2021-12-03

## 2021-12-03 DIAGNOSIS — F17.200 SMOKING: ICD-10-CM

## 2021-12-03 DIAGNOSIS — Z79.4 TYPE 2 DIABETES MELLITUS WITH HYPERGLYCEMIA, WITH LONG-TERM CURRENT USE OF INSULIN (HCC): Primary | ICD-10-CM

## 2021-12-03 DIAGNOSIS — I10 PRIMARY HYPERTENSION: ICD-10-CM

## 2021-12-03 DIAGNOSIS — E11.65 TYPE 2 DIABETES MELLITUS WITH HYPERGLYCEMIA, WITH LONG-TERM CURRENT USE OF INSULIN (HCC): Primary | ICD-10-CM

## 2021-12-03 DIAGNOSIS — E11.42 DIABETIC POLYNEUROPATHY ASSOCIATED WITH TYPE 2 DIABETES MELLITUS (HCC): ICD-10-CM

## 2021-12-03 PROBLEM — E78.2 MIXED HYPERLIPIDEMIA: Status: ACTIVE | Noted: 2021-12-03

## 2021-12-03 PROCEDURE — 99214 OFFICE O/P EST MOD 30 MIN: CPT | Performed by: NURSE PRACTITIONER

## 2021-12-03 NOTE — PROGRESS NOTES
Chief Complaint  Diabetes    Subjective          Hiren Christianson presents to [facility] ENDOCRINOLOGY  History of Present Illness       You have chosen to receive care through a telehealth visit.  Do you consent to use a video/audio connection for your medical care today? Yes          TELEHEALTH VIDEO VISIT     This a video visit due to [facility] current guidelines for social distancing due to the COVID 19 pandemic      Primary provider Dr. Cordoba    56-year-old female presents for follow-up    Reason diabetes mellitus type 2    Diagnosed in 2005    Timing constant    Quality not controlled    Severity is high    Lab Results   Component Value Date    HGBA1C 9.80 (H) 07/02/2021                      Macrovascular complications--- No CAD, no PAD, no CVA      Microvascular complications ---neuropathy , no DR, no renal disease      Current diabetes regimen --oral medications, insulin      Current glucose monitoring      Fingerstick 4 times daily for Dexcom patient now has Dexcom        Dexcom G6 could not download       Review of Systems - General ROS: negative                     Objective   Vital Signs:   There were no vitals taken for this visit.    Physical Exam  Neurological:      General: No focal deficit present.      Mental Status: She is alert.   Psychiatric:         Mood and Affect: Mood normal.         Thought Content: Thought content normal.        Result Review :   The following data was reviewed by: JAMESON Price on 12/03/2021:  Common labs    Common Labsle 2/20/21 7/2/21 7/2/21 7/2/21     1411 1411 1411   Glucose    81   BUN    11   Creatinine    0.74   eGFR Non African Am    81   Sodium    137   Potassium    4.1   Chloride    100   Calcium    9.1   Albumin    4.30   Total Bilirubin    0.2   Alkaline Phosphatase    105   AST (SGOT)    17   ALT (SGPT)    14   WBC  7.58     Hemoglobin  16.7 (A)     Hematocrit  48.9 (A)     Platelets  379     Hemoglobin A1C 9.8 (A)  9.80 (A)     (A) Abnormal value       Comments are available for some flowsheets but are not being displayed.                     Assessment and Plan    Diagnoses and all orders for this visit:    1. Type 2 diabetes mellitus with hyperglycemia, with long-term current use of insulin (HCC) (Primary)    2. Smoking    3. Primary hypertension    4. Diabetic polyneuropathy associated with type 2 diabetes mellitus (HCC)           Glycemic Management:     Diabetes mellitus type 2               Lab Results   Component Value Date     HGBA1C 9.8 (H) 02/20/2021            Dexcom G6 downloaded and reviewed        high in the morning       Lows at lunch           Decrease morning insulin and increase bedtime insulin                 Metformin 500 mg BID taking            Taking Humulin U 500     Take 50   units 30 minutes before breakfast-- decrease to 45 units       If you have a low during the day decrease by 10 units      Take 60 units 30 minutes before supper--increase to 65 units      If you have a low over night decrease the supper dose by 10 units         Keep you Admelog for rescue---only used once      Sliding scale      200-250           Give 3 units   251-300           Give 4 units   301 to 350       Give 5 units  Above 351       Give 6 units                   Trulicity 1.5 mg weekly --keep               Side effects can be nausea     If nauseated while eating , the medication is saying stop eating      If vomiting or abdominal pain stop medication         Previous regimen          Taking Admelog 60 up to 70 units TID before each meal --stop     Microvascular Complications Monitoring         Last eye exam----- Dec. 2020, no DR      Neuropathy --yes      Taking gabapentin 400 mg tid            Lipid Management:      Not on statin        Blood Pressure Management:        Taking Hyzaar 50-12.5 mg daily            Thyroid Health           Lab Results   Component Value Date     TSH 1.210 07/02/2021               Bone Power Fingerprinting             Lab Results   Component Value Date     CALCIUM 9.1 07/02/2021                  Weight Management:        Obesity            Preventive Care:      Smoker     Hiren Christianson  reports that she has been smoking cigarettes. She has been smoking about 1.00 pack per day. She has never used smokeless tobacco.. I have educated her on the risk of diseases from using tobacco products such as cancer and COPD.     I advised her to quit and she is not willing to quit.    I spent 3  minutes counseling the patient.                              Follow Up   No follow-ups on file.  Patient was given instructions and counseling regarding her condition or for health maintenance advice. Please see specific information pulled into the AVS if appropriate.         This document has been electronically signed by JAMESON Price on December 3, 2021 10:23 CST.

## 2022-02-25 NOTE — TELEPHONE ENCOUNTER
Pt said [facility] sent us a PA for Trulicity. PT IS OUT OF MEDS, can we give her any samples for over the weekend while this gets done? Please call pt back       Thanks

## 2022-02-28 ENCOUNTER — DOCUMENTATION (OUTPATIENT)
Dept: ENDOCRINOLOGY | Facility: CLINIC | Age: 57
End: 2022-02-28

## 2022-02-28 RX ORDER — DULAGLUTIDE 1.5 MG/.5ML
INJECTION, SOLUTION SUBCUTANEOUS
Qty: 4 PEN | Refills: 11 | Status: SHIPPED | OUTPATIENT
Start: 2022-02-28 | End: 2022-12-21 | Stop reason: SDUPTHER

## 2022-03-10 ENCOUNTER — OFFICE VISIT (OUTPATIENT)
Dept: ENDOCRINOLOGY | Facility: CLINIC | Age: 57
End: 2022-03-10

## 2022-03-10 VITALS
SYSTOLIC BLOOD PRESSURE: 142 MMHG | OXYGEN SATURATION: 96 % | HEART RATE: 107 BPM | WEIGHT: 238.1 LBS | BODY MASS INDEX: 42.19 KG/M2 | DIASTOLIC BLOOD PRESSURE: 80 MMHG | HEIGHT: 63 IN

## 2022-03-10 DIAGNOSIS — Z79.4 TYPE 2 DIABETES MELLITUS WITH HYPERGLYCEMIA, WITH LONG-TERM CURRENT USE OF INSULIN: Primary | ICD-10-CM

## 2022-03-10 DIAGNOSIS — E78.2 MIXED HYPERLIPIDEMIA: ICD-10-CM

## 2022-03-10 DIAGNOSIS — F17.200 SMOKING: ICD-10-CM

## 2022-03-10 DIAGNOSIS — E11.65 TYPE 2 DIABETES MELLITUS WITH HYPERGLYCEMIA, WITH LONG-TERM CURRENT USE OF INSULIN: Primary | ICD-10-CM

## 2022-03-10 DIAGNOSIS — E11.42 DIABETIC POLYNEUROPATHY ASSOCIATED WITH TYPE 2 DIABETES MELLITUS: ICD-10-CM

## 2022-03-10 PROCEDURE — 99214 OFFICE O/P EST MOD 30 MIN: CPT | Performed by: NURSE PRACTITIONER

## 2022-03-10 PROCEDURE — 95251 CONT GLUC MNTR ANALYSIS I&R: CPT | Performed by: NURSE PRACTITIONER

## 2022-03-10 NOTE — PROGRESS NOTES
"Chief Complaint  Diabetes    Subjective          Hiren Christianson presents to [facility] ENDOCRINOLOGY  History of Present Illness       Primary provider Dr. Cordoba     56-year-old female presents for follow-up     Reason diabetes mellitus type 2     Diagnosed in 2005     Timing constant     Quality not controlled     Severity is high      Macrovascular complications--- No CAD, no PAD, no CVA        Microvascular complications ---neuropathy , no DR, no renal disease         Current diabetes regimen --oral medications, insulin       Current glucose monitoring      Fingerstick 4 times daily for Dexcom patient now has Dexcom        Dexcom G6 could not download          Review of Systems - General ROS: negative      Objective   Vital Signs:   /80   Pulse 107   Ht 160 cm (63\")   Wt 108 kg (238 lb 1.6 oz)   SpO2 96%   BMI 42.18 kg/m²     Physical Exam  Constitutional:       Appearance: Normal appearance.   Cardiovascular:      Rate and Rhythm: Regular rhythm.      Heart sounds: Normal heart sounds.   Pulmonary:      Breath sounds: Normal breath sounds.   Musculoskeletal:      Cervical back: Normal range of motion.   Neurological:      Mental Status: She is alert.        Result Review :   The following data was reviewed by: JAMESON Price on 03/10/2022:  Common labs    Common Labsle 7/2/21 7/2/21 7/2/21 12/21/21 12/21/21    1411 1411 1411 0920 0920   Glucose   81     Glucose    138 (A)    BUN   11     Creatinine   0.74     eGFR Non African Am   81     Sodium   137     Potassium   4.1     Chloride   100     Calcium   9.1     Albumin   4.30     Total Bilirubin   0.2     Alkaline Phosphatase   105     AST (SGOT)   17     ALT (SGPT)   14     WBC 7.58       Hemoglobin 16.7 (A)       Hematocrit 48.9 (A)       Platelets 379       Hemoglobin A1C  9.80 (A)   8.1 (A)   (A) Abnormal value                      Assessment and Plan    Diagnoses and all orders for this visit:    1. Type 2 " diabetes mellitus with hyperglycemia, with long-term current use of insulin (HCC) (Primary)    2. Diabetic polyneuropathy associated with type 2 diabetes mellitus (HCC)    3. Mixed hyperlipidemia    4. Smoking           Glycemic Management:     Diabetes mellitus type 2     Lab Results   Component Value Date    HGBA1C 8.1 (H) 12/21/2021         Dexcom G6     Downloaded and reviewed     Dated from Feb. 25 to March 10, 2022     Average bg 163     Time in target 69%     High 28%     Very high 2%     Low less than 1%     Very low 0 %       Several at goal     She has an occasional hypo over night     occasional hyper post high carb meal --          Metformin 500 mg BID            Taking Humulin U 500     Take 45    units 30 minutes before breakfast--increase to 50 units         If you have a low during the day decrease by 10 units      Take 60 units 30 minutes before supper-- decrease to 55 units      If you have a low over night decrease the supper dose by 10 units         Keep you Admelog for rescue---only used once      Sliding scale      200-250           Give 3 units   251-300           Give 4 units   301 to 350       Give 5 units  Above 351       Give 6 units                   Trulicity 1.5 mg weekly --keep               Side effects can be nausea     If nauseated while eating , the medication is saying stop eating      If vomiting or abdominal pain stop medication         Previous regimen          Taking Admelog 60 up to 70 units TID before each meal --stop     Microvascular Complications Monitoring         Last eye exam----- Dec. 2021, no DR      Neuropathy --yes      Taking gabapentin 400 mg tid            Lipid Management:      Not on statin        Blood Pressure Management:        Taking Hyzaar 50-12.5 mg daily            Thyroid Health     Lab Results   Component Value Date    TSH 1.210 07/02/2021             Bone Health                Lab Results   Component Value Date     CALCIUM 9.1 07/02/2021                   Weight Management:        Obesity     Patient's Body mass index is 42.18 kg/m². indicating that she is morbidly obese (BMI > 40 or > 35 with obesity - related health condition). Obesity-related health conditions include the following: diabetes mellitus. Obesity is unchanged. BMI is is above average; no BMI management plan is appropriate. We discussed portion control and increasing exercise..             Preventive Care:      Smoker     Hiren Christianson  reports that she has been smoking cigarettes. She has been smoking about 1.00 pack per day. She has never used smokeless tobacco.. I have educated her on the risk of diseases from using tobacco products such as cancer, COPD and heart disease.     I advised her to quit and she is not willing to quit.    I spent 3  minutes counseling the patient.                                                Follow Up   Return in about 3 months (around 6/10/2022) for Recheck.  Patient was given instructions and counseling regarding her condition or for health maintenance advice. Please see specific information pulled into the AVS if appropriate.         This document has been electronically signed by JAMESON Price on March 10, 2022 10:41 CST.

## 2022-03-21 RX ORDER — ONDANSETRON 4 MG/1
4 TABLET, ORALLY DISINTEGRATING ORAL EVERY 8 HOURS PRN
Qty: 20 TABLET | Refills: 11 | Status: SHIPPED | OUTPATIENT
Start: 2022-03-21 | End: 2022-04-15

## 2022-03-25 RX ORDER — INSULIN HUMAN 500 [IU]/ML
INJECTION, SOLUTION SUBCUTANEOUS
Qty: 6 PEN | Refills: 11 | Status: SHIPPED | OUTPATIENT
Start: 2022-03-25 | End: 2022-12-01

## 2022-04-15 RX ORDER — ONDANSETRON 4 MG/1
4 TABLET, ORALLY DISINTEGRATING ORAL EVERY 8 HOURS PRN
Qty: 20 TABLET | Refills: 11 | Status: SHIPPED | OUTPATIENT
Start: 2022-04-15 | End: 2023-03-28 | Stop reason: SDUPTHER

## 2022-05-16 RX ORDER — PROCHLORPERAZINE 25 MG/1
SUPPOSITORY RECTAL
Qty: 9 EACH | Refills: 3 | Status: SHIPPED | OUTPATIENT
Start: 2022-05-16

## 2022-06-14 ENCOUNTER — OFFICE VISIT (OUTPATIENT)
Dept: ENDOCRINOLOGY | Facility: CLINIC | Age: 57
End: 2022-06-14

## 2022-06-14 VITALS
BODY MASS INDEX: 40.82 KG/M2 | HEART RATE: 100 BPM | HEIGHT: 63 IN | WEIGHT: 230.4 LBS | DIASTOLIC BLOOD PRESSURE: 82 MMHG | SYSTOLIC BLOOD PRESSURE: 140 MMHG | OXYGEN SATURATION: 96 %

## 2022-06-14 DIAGNOSIS — E11.649 TYPE 2 DIABETES MELLITUS WITH HYPOGLYCEMIA WITHOUT COMA, WITH LONG-TERM CURRENT USE OF INSULIN: Primary | ICD-10-CM

## 2022-06-14 DIAGNOSIS — E55.9 VITAMIN D DEFICIENCY: ICD-10-CM

## 2022-06-14 DIAGNOSIS — F17.200 SMOKING: ICD-10-CM

## 2022-06-14 DIAGNOSIS — E78.2 MIXED HYPERLIPIDEMIA: ICD-10-CM

## 2022-06-14 DIAGNOSIS — E11.42 DIABETIC POLYNEUROPATHY ASSOCIATED WITH TYPE 2 DIABETES MELLITUS: ICD-10-CM

## 2022-06-14 DIAGNOSIS — Z79.4 TYPE 2 DIABETES MELLITUS WITH HYPOGLYCEMIA WITHOUT COMA, WITH LONG-TERM CURRENT USE OF INSULIN: Primary | ICD-10-CM

## 2022-06-14 PROCEDURE — 99214 OFFICE O/P EST MOD 30 MIN: CPT | Performed by: NURSE PRACTITIONER

## 2022-06-14 PROCEDURE — 95251 CONT GLUC MNTR ANALYSIS I&R: CPT | Performed by: NURSE PRACTITIONER

## 2022-06-14 NOTE — PROGRESS NOTES
"Chief Complaint  Diabetes    Subjective          Hiren Christianson presents to [facility] ENDOCRINOLOGY  History of Present Illness     In office visit       Primary provider Dr. Cordoba     57 year old female presents for follow up      Reason diabetes mellitus type 2     Diagnosed in 2005     Timing constant     Quality not controlled     Severity is high        Macrovascular complications--- No CAD, no PAD, no CVA           Microvascular complications ---neuropathy , no DR, no renal disease            Current diabetes regimen --oral medications, insulin         Current glucose monitoring      Fingerstick 4 times daily         Dexcom G6 could not download     Review of Systems - General ROS: negative                 Objective   Vital Signs:   /82   Pulse 100   Ht 160 cm (63\")   Wt 105 kg (230 lb 6.4 oz)   SpO2 96%   BMI 40.81 kg/m²     Physical Exam  Constitutional:       Appearance: Normal appearance.   Cardiovascular:      Rate and Rhythm: Regular rhythm.      Heart sounds: Normal heart sounds.   Pulmonary:      Breath sounds: Normal breath sounds.   Musculoskeletal:      Cervical back: Normal range of motion.   Neurological:      Mental Status: She is alert.        Result Review :   The following data was reviewed by: JAMESON Price on 06/14/2022:  Common labs    Common Labsle 7/2/21 7/2/21 7/2/21 12/21/21 12/21/21 4/8/22 4/8/22    1411 1411 1411 0920 0920 0905 0905   Glucose   81       Glucose    138 (A)  143 (A)    BUN   11   12    Creatinine   0.74   0.7    eGFR Non African Am   81       Sodium   137   140    Potassium   4.1   4.6    Chloride   100   104    Calcium   9.1   9.4    Albumin   4.30       Total Bilirubin   0.2       Alkaline Phosphatase   105       AST (SGOT)   17       ALT (SGPT)   14       WBC 7.58         Hemoglobin 16.7 (A)         Hematocrit 48.9 (A)         Platelets 379         Hemoglobin A1C  9.80 (A)   8.1 (A)  7.8 (A)   (A) Abnormal value  "                     Assessment and Plan    Diagnoses and all orders for this visit:    1. Type 2 diabetes mellitus with hypoglycemia without coma, with long-term current use of insulin (HCC) (Primary)  -     CBC & Differential; Future  -     Comprehensive Metabolic Panel; Future  -     Hemoglobin A1c; Future  -     Lipid Panel; Future  -     Microalbumin / Creatinine Urine Ratio - Urine, Clean Catch; Future  -     TSH; Future  -     Vitamin B12; Future  -     Vitamin D 25 Hydroxy; Future    2. Diabetic polyneuropathy associated with type 2 diabetes mellitus (HCC)  -     CBC & Differential; Future  -     Comprehensive Metabolic Panel; Future  -     Hemoglobin A1c; Future  -     Lipid Panel; Future  -     Microalbumin / Creatinine Urine Ratio - Urine, Clean Catch; Future  -     TSH; Future  -     Vitamin B12; Future  -     Vitamin D 25 Hydroxy; Future    3. Mixed hyperlipidemia  -     CBC & Differential; Future  -     Comprehensive Metabolic Panel; Future  -     Hemoglobin A1c; Future  -     Lipid Panel; Future  -     Microalbumin / Creatinine Urine Ratio - Urine, Clean Catch; Future  -     TSH; Future  -     Vitamin B12; Future  -     Vitamin D 25 Hydroxy; Future    4. Vitamin D deficiency  -     CBC & Differential; Future  -     Comprehensive Metabolic Panel; Future  -     Hemoglobin A1c; Future  -     Lipid Panel; Future  -     Microalbumin / Creatinine Urine Ratio - Urine, Clean Catch; Future  -     TSH; Future  -     Vitamin B12; Future  -     Vitamin D 25 Hydroxy; Future    5. Smoking           Glycemic Management:     Diabetes mellitus type 2     Lab Results   Component Value Date    HGBA1C 7.8 (H) 04/08/2022          Dexcom G6      Downloaded and reviewed     Dated from June 1  To June 14, 2022    Average bg 156     Time in target 78%     High 17%     Very high 4%     Low 1 %     Very low 0 %         Lows are occurring when missing meals --decrease insulin     No other changes                       Metformin  500 mg BID            Taking Humulin U 500     Take 40  units 30 minutes before breakfast-decrease to 35 units      If you have a low during the day decrease by 10 units      Take 50  units 30 minutes before supper-decrease to 45 units      If you have a low over night decrease the supper dose by 10 units         Keep you Admelog for rescue---only used once      Sliding scale      200-250           Give 3 units   251-300           Give 4 units   301 to 350       Give 5 units  Above 351       Give 6 units                   Trulicity 1.5 mg weekly --keep               Side effects can be nausea     If nauseated while eating , the medication is saying stop eating      If vomiting or abdominal pain stop medication         Previous regimen          Taking Admelog 60 up to 70 units TID before each meal --stop     Microvascular Complications Monitoring         Last eye exam----- Dec. 2021, no DR      Neuropathy --yes      Taking gabapentin 400 mg tid            Lipid Management:      Not on statin        Blood Pressure Management:        Taking Hyzaar 50-12.5 mg daily            Thyroid Health     Lab Results   Component Value Date    TSH 1.210 07/02/2021             Bone Health                Lab Results   Component Value Date     CALCIUM 9.1 07/02/2021                  Weight Management:        Obesity      Body mass index is 40.81 kg/m².               Preventive Care:      Smoker     Hiren Christianson  reports that she has been smoking cigarettes. She has been smoking about 1.00 pack per day. She has never used smokeless tobacco.. I have educated her on the risk of diseases from using tobacco products such as cancer, COPD and heart disease.     I advised her to quit and she is not willing to quit.    I spent 3  minutes counseling the patient.                                                Follow Up   Return in about 3 months (around 9/14/2022) for Recheck.  Patient was given instructions and counseling regarding her  condition or for health maintenance advice. Please see specific information pulled into the AVS if appropriate.         This document has been electronically signed by JAMESON Price on June 14, 2022 10:36 CDT.

## 2022-06-20 RX ORDER — PROCHLORPERAZINE 25 MG/1
1 SUPPOSITORY RECTAL
Qty: 1 EACH | Refills: 3 | Status: SHIPPED | OUTPATIENT
Start: 2022-06-20

## 2022-10-28 DIAGNOSIS — E11.42 DIABETIC POLYNEUROPATHY ASSOCIATED WITH TYPE 2 DIABETES MELLITUS: ICD-10-CM

## 2022-10-28 DIAGNOSIS — E55.9 VITAMIN D DEFICIENCY: ICD-10-CM

## 2022-10-28 DIAGNOSIS — E78.2 MIXED HYPERLIPIDEMIA: ICD-10-CM

## 2022-10-28 DIAGNOSIS — E11.649 TYPE 2 DIABETES MELLITUS WITH HYPOGLYCEMIA WITHOUT COMA, WITH LONG-TERM CURRENT USE OF INSULIN: ICD-10-CM

## 2022-10-28 DIAGNOSIS — Z79.4 TYPE 2 DIABETES MELLITUS WITH HYPOGLYCEMIA WITHOUT COMA, WITH LONG-TERM CURRENT USE OF INSULIN: ICD-10-CM

## 2022-11-04 ENCOUNTER — DOCUMENTATION (OUTPATIENT)
Dept: ENDOCRINOLOGY | Facility: CLINIC | Age: 57
End: 2022-11-04

## 2022-11-04 NOTE — PROGRESS NOTES
DEXCOM G6 SENSOR APPROVED FROM 11/04/22 TO 11/04/23    SENT TO [facility] REC

## 2022-11-08 ENCOUNTER — TELEPHONE (OUTPATIENT)
Dept: ENDOCRINOLOGY | Facility: CLINIC | Age: 57
End: 2022-11-08

## 2022-11-08 RX ORDER — PEN NEEDLE, DIABETIC 31 GX5/16"
1 NEEDLE, DISPOSABLE MISCELLANEOUS 2 TIMES DAILY
Qty: 100 EACH | Refills: 3 | Status: SHIPPED | OUTPATIENT
Start: 2022-11-08

## 2022-11-08 NOTE — TELEPHONE ENCOUNTER
Incoming Refill Request      Medication requested (name and dose): [facility]     Pharmacy where request should be sent: [facility]   Additional details provided by patient: none   Best call back number: [phone]    Does the patient have less than a 3 day supply:  [x] Yes  [] No    Radha Eli Rep  11/08/22, 13:23 CST

## 2022-11-18 ENCOUNTER — TELEPHONE (OUTPATIENT)
Dept: ENDOCRINOLOGY | Facility: CLINIC | Age: 57
End: 2022-11-18

## 2022-11-18 NOTE — TELEPHONE ENCOUNTER
PATIENT LEFT  REGARDING PA FOR TRULICITY. I CALLED PATIENT TO GET INSURANCE INFORMATION DUE TO CHANGE OF INSURANCE AND I INFORMED HER WE WOULD BE GETTING THIS PA SUBMITTED TODAY.     I TRIED CALLING PATIENT TO INFORM HER THE PA WAS APPROVED FROM 11/18/2022 TO 11/18/2023.

## 2022-12-01 RX ORDER — INSULIN HUMAN 500 [IU]/ML
INJECTION, SOLUTION SUBCUTANEOUS
Qty: 6 ML | Refills: 0 | Status: SHIPPED | OUTPATIENT
Start: 2022-12-01

## 2022-12-01 RX ORDER — INSULIN HUMAN 500 [IU]/ML
INJECTION, SOLUTION SUBCUTANEOUS
Qty: 18 ML | Refills: 11 | Status: SHIPPED | OUTPATIENT
Start: 2022-12-01 | End: 2022-12-01

## 2022-12-21 ENCOUNTER — TELEPHONE (OUTPATIENT)
Dept: ENDOCRINOLOGY | Facility: CLINIC | Age: 57
End: 2022-12-21

## 2022-12-21 ENCOUNTER — OFFICE VISIT (OUTPATIENT)
Dept: ENDOCRINOLOGY | Facility: CLINIC | Age: 57
End: 2022-12-21

## 2022-12-21 VITALS
HEIGHT: 63 IN | DIASTOLIC BLOOD PRESSURE: 60 MMHG | WEIGHT: 216.4 LBS | BODY MASS INDEX: 38.34 KG/M2 | HEART RATE: 92 BPM | SYSTOLIC BLOOD PRESSURE: 140 MMHG | OXYGEN SATURATION: 97 %

## 2022-12-21 DIAGNOSIS — Z79.4 TYPE 2 DIABETES MELLITUS WITH HYPERGLYCEMIA, WITH LONG-TERM CURRENT USE OF INSULIN: Primary | ICD-10-CM

## 2022-12-21 DIAGNOSIS — E11.65 TYPE 2 DIABETES MELLITUS WITH HYPERGLYCEMIA, WITH LONG-TERM CURRENT USE OF INSULIN: Primary | ICD-10-CM

## 2022-12-21 DIAGNOSIS — E55.9 VITAMIN D DEFICIENCY: ICD-10-CM

## 2022-12-21 DIAGNOSIS — F17.200 SMOKING: ICD-10-CM

## 2022-12-21 DIAGNOSIS — E78.2 MIXED HYPERLIPIDEMIA: ICD-10-CM

## 2022-12-21 DIAGNOSIS — E11.42 DIABETIC POLYNEUROPATHY ASSOCIATED WITH TYPE 2 DIABETES MELLITUS: ICD-10-CM

## 2022-12-21 PROCEDURE — 99214 OFFICE O/P EST MOD 30 MIN: CPT | Performed by: NURSE PRACTITIONER

## 2022-12-21 RX ORDER — DULAGLUTIDE 4.5 MG/.5ML
4.5 INJECTION, SOLUTION SUBCUTANEOUS WEEKLY
Qty: 2 ML | Refills: 6 | Status: SHIPPED | OUTPATIENT
Start: 2022-12-21 | End: 2023-01-27

## 2022-12-21 RX ORDER — CHOLECALCIFEROL (VITAMIN D3) 125 MCG
5 CAPSULE ORAL DAILY
COMMUNITY
Start: 2022-11-03

## 2022-12-21 NOTE — TELEPHONE ENCOUNTER
[facility] CALLED TO CLARIFY TRULICITY RX. THEY STATED SHE HAS BEEN PICKING UP 3 MG SINCE 9/29/22. PER NELSON'S INSTRUCTIONS, SENT IN 4.5 MG DOSAGE TO PHARMACY.

## 2022-12-21 NOTE — PROGRESS NOTES
"Chief Complaint  Diabetes    Subjective          Hiren Christianson presents to [facility] ENDOCRINOLOGY  History of Present Illness       In office visit       Primary provider Dr. Reed     57 year old female presents for follow up      Reason diabetes mellitus type 2     Diagnosed in 2005     Timing constant     Quality not controlled     Severity is high        Macrovascular complications--- No CAD, no PAD, no CVA        Microvascular complications ---neuropathy , no DR, no renal disease            Current diabetes regimen --oral medications, insulin         Current glucose monitoring      Fingerstick 4 times daily        Using dexcom but could not download      Review of Systems - General ROS: negative              Objective   Vital Signs:   /60   Pulse 92   Ht 160 cm (63\")   Wt 98.2 kg (216 lb 6.4 oz)   SpO2 97%   BMI 38.33 kg/m²     Physical Exam  Constitutional:       Appearance: Normal appearance.   Cardiovascular:      Rate and Rhythm: Regular rhythm.      Heart sounds: Normal heart sounds.   Pulmonary:      Breath sounds: Normal breath sounds.   Musculoskeletal:      Cervical back: Normal range of motion.   Neurological:      Mental Status: She is alert.        Result Review :   The following data was reviewed by: JAMESON Price on 06/14/2022:  Common labs    Common Labs 4/8/22 4/8/22 10/27/22 10/27/22 10/27/22    0905 0905 0941 0941 0941   Glucose 143 (A)  157 (A)     BUN 12  13     Creatinine 0.7  0.8     Sodium 140  139     Potassium 4.6  4.4     Chloride 104  103     Calcium 9.4  9.0     Albumin   4.0     Total Bilirubin   0.35     Alkaline Phosphatase   88     AST (SGOT)   13     ALT (SGPT)   12     Total Cholesterol    247 (A)    Triglycerides    231 (A)    HDL Cholesterol    31    LDL Cholesterol     170    Hemoglobin A1C  7.8 (A)   7.4 (A)   (A) Abnormal value       Comments are available for some flowsheets but are not being displayed.            "            Assessment and Plan    Diagnoses and all orders for this visit:    1. Type 2 diabetes mellitus with hyperglycemia, with long-term current use of insulin (HCC) (Primary)    2. Diabetic polyneuropathy associated with type 2 diabetes mellitus (HCC)    3. Smoking    4. Mixed hyperlipidemia    5. Vitamin D deficiency    Other orders  -     Dulaglutide 1.5 MG/0.5ML solution pen-injector; Inject 1.5 mg under the skin into the appropriate area as directed 1 (One) Time Per Week.  Dispense: 2 mL; Refill: 11  -     empagliflozin (Jardiance) 25 MG tablet tablet; One tablet daily before breakfast  Dispense: 30 tablet; Refill: 11           Glycemic Management:     Diabetes mellitus type 2     Lab Results   Component Value Date    HGBA1C 7.4 (H) 10/27/2022          Dexcom G6      Could not download but she is using     Increase to glp-1     Add jardiance 10 mg one daily       Side effects  Discussed            Metformin 500 mg BID            Taking Humulin U 500     Take 35  units 30 minutes before breakfast     If you have a low during the day decrease by 10 units      Take 45   units 30 minutes before suppers      If you have a low over night decrease the supper dose by 10 units         Keep you Admelog for rescue---only used once      Sliding scale      200-250           Give 3 units   251-300           Give 4 units   301 to 350       Give 5 units  Above 351       Give 6 units               Taking Trulicity 0.75 mg one weekly ---  increase to 1.5 weekly               Side effects can be nausea     If nauseated while eating , the medication is saying stop eating      If vomiting or abdominal pain stop medication         Previous regimen          Taking Admelog 60 up to 70 units TID before each meal --stop     Microvascular Complications Monitoring         Last eye exam----- Dec. 2021, no       Neuropathy --yes      Taking gabapentin 400 mg tid            Lipid Management:      Not on statin      Total Cholesterol    Date Value Ref Range Status   05/08/2019 183 100 - 200 mg/dL Final     Triglycerides   Date Value Ref Range Status   10/27/2022 231 (H) 10 - 150 mg/dL Final     HDL Cholesterol   Date Value Ref Range Status   10/27/2022 31 23 - 92 mg/dL Final     LDL Cholesterol    Date Value Ref Range Status   10/27/2022 170 mg/dL Final     Comment:         OPTIMAL: <100 mg/dl  LOW RISK: 100-129 mg/dl  BORDERLINE HIGH: 130-159 mg/dl  HIGH: 160-189 mg/dl  VERY HIGH: >189 mg/dl           Blood Pressure Management:        Taking Hyzaar 50-12.5 mg daily            Thyroid Health     Lab Results   Component Value Date    TSH 1.210 07/02/2021             Bone Health                Lab Results   Component Value Date     CALCIUM 9.1 07/02/2021                  Weight Management:        Obesity      Body mass index is 38.33 kg/m².               Preventive Care:      Smoker     Hiren Christianson  reports that she has been smoking cigarettes. She has been smoking an average of 1 pack per day. She has never used smokeless tobacco.. I have educated her on the risk of diseases from using tobacco products such as cancer, COPD and heart disease.     I advised her to quit and she is not willing to quit.    I spent 3  minutes counseling the patient.                                                Follow Up   No follow-ups on file.  Patient was given instructions and counseling regarding her condition or for health maintenance advice. Please see specific information pulled into the AVS if appropriate.         This document has been electronically signed by JAMESON Price on December 21, 2022 10:44 CST.

## 2023-01-26 ENCOUNTER — TELEPHONE (OUTPATIENT)
Dept: ENDOCRINOLOGY | Facility: CLINIC | Age: 58
End: 2023-01-26
Payer: COMMERCIAL

## 2023-01-26 DIAGNOSIS — E11.65 TYPE 2 DIABETES MELLITUS WITH HYPERGLYCEMIA, WITH LONG-TERM CURRENT USE OF INSULIN: Primary | ICD-10-CM

## 2023-01-26 DIAGNOSIS — Z79.4 TYPE 2 DIABETES MELLITUS WITH HYPERGLYCEMIA, WITH LONG-TERM CURRENT USE OF INSULIN: Primary | ICD-10-CM

## 2023-01-26 NOTE — TELEPHONE ENCOUNTER
Patient called and stated she can not get Trulicity in a 4.0 dose. Her Pharmacy does have the 2.0 dose available. She also stated that the Jardiance is to expensive and asked if there was something else she could take. Please send new prescriptions to [facility].    Phone [phone]    Thank you

## 2023-01-27 RX ORDER — DULAGLUTIDE 1.5 MG/.5ML
1.5 INJECTION, SOLUTION SUBCUTANEOUS WEEKLY
Qty: 2 ML | Refills: 3 | Status: SHIPPED | OUTPATIENT
Start: 2023-01-27

## 2023-01-30 ENCOUNTER — DOCUMENTATION (OUTPATIENT)
Dept: ENDOCRINOLOGY | Facility: CLINIC | Age: 58
End: 2023-01-30
Payer: COMMERCIAL

## 2023-02-14 ENCOUNTER — DOCUMENTATION (OUTPATIENT)
Dept: ENDOCRINOLOGY | Facility: CLINIC | Age: 58
End: 2023-02-14
Payer: COMMERCIAL

## 2023-02-14 NOTE — PROGRESS NOTES
PA SUBMITTED FOR TRULICITY 1.5 ML.    TRULICITY APPROVED FROM 2/14/23 TO 2/13/24    SENT TO MED REC

## 2023-03-28 ENCOUNTER — TELEPHONE (OUTPATIENT)
Dept: ENDOCRINOLOGY | Facility: CLINIC | Age: 58
End: 2023-03-28

## 2023-03-28 ENCOUNTER — OFFICE VISIT (OUTPATIENT)
Dept: ENDOCRINOLOGY | Facility: CLINIC | Age: 58
End: 2023-03-28
Payer: COMMERCIAL

## 2023-03-28 VITALS
OXYGEN SATURATION: 95 % | HEART RATE: 104 BPM | HEIGHT: 63 IN | WEIGHT: 216.9 LBS | DIASTOLIC BLOOD PRESSURE: 82 MMHG | SYSTOLIC BLOOD PRESSURE: 126 MMHG | BODY MASS INDEX: 38.43 KG/M2

## 2023-03-28 DIAGNOSIS — E11.65 TYPE 2 DIABETES MELLITUS WITH HYPERGLYCEMIA, WITH LONG-TERM CURRENT USE OF INSULIN: ICD-10-CM

## 2023-03-28 DIAGNOSIS — Z79.4 TYPE 2 DIABETES MELLITUS WITH HYPERGLYCEMIA, WITH LONG-TERM CURRENT USE OF INSULIN: ICD-10-CM

## 2023-03-28 DIAGNOSIS — E55.9 VITAMIN D DEFICIENCY: ICD-10-CM

## 2023-03-28 DIAGNOSIS — E78.2 MIXED HYPERLIPIDEMIA: ICD-10-CM

## 2023-03-28 DIAGNOSIS — F17.200 SMOKING: ICD-10-CM

## 2023-03-28 DIAGNOSIS — E11.42 DIABETIC POLYNEUROPATHY ASSOCIATED WITH TYPE 2 DIABETES MELLITUS: Primary | ICD-10-CM

## 2023-03-28 PROCEDURE — 99214 OFFICE O/P EST MOD 30 MIN: CPT | Performed by: NURSE PRACTITIONER

## 2023-03-28 PROCEDURE — 95251 CONT GLUC MNTR ANALYSIS I&R: CPT | Performed by: NURSE PRACTITIONER

## 2023-03-28 RX ORDER — ONDANSETRON 4 MG/1
4 TABLET, ORALLY DISINTEGRATING ORAL EVERY 8 HOURS PRN
Qty: 20 TABLET | Refills: 11 | Status: SHIPPED | OUTPATIENT
Start: 2023-03-28

## 2023-03-28 RX ORDER — TIRZEPATIDE 2.5 MG/.5ML
2.5 INJECTION, SOLUTION SUBCUTANEOUS
Qty: 2 ML | Refills: 11 | Status: SHIPPED | OUTPATIENT
Start: 2023-03-28 | End: 2023-04-19

## 2023-03-28 NOTE — TELEPHONE ENCOUNTER
Patient called to let us know her prescription for Mounjaro needs a prior authorization.    Phone [phone]    Thank you

## 2023-03-28 NOTE — PROGRESS NOTES
"Chief Complaint  Diabetes    Subjective          Hiren Christianson presents to [facility] ENDOCRINOLOGY  History of Present Illness       In office visit       Primary provider Dr. Reed     58 year old female presents for follow up     Diabetes mellitus type 2    Duration 2005     Quality near control    Severity high     Timing constant             Microvascular complications ---neuropathy , no DR, no renal disease            Current diabetes regimen --oral medications, insulin , GP-1         Current glucose monitoring      Fingerstick 4 times daily        Uses dexcom G6    See below    Review of Systems - General ROS: negative              Objective   Vital Signs:   /82   Pulse 104   Ht 160 cm (63\")   Wt 98.4 kg (216 lb 14.4 oz)   SpO2 95%   BMI 38.42 kg/m²     Physical Exam  Constitutional:       Appearance: Normal appearance.   Cardiovascular:      Rate and Rhythm: Regular rhythm.      Heart sounds: Normal heart sounds.   Pulmonary:      Breath sounds: Normal breath sounds.   Musculoskeletal:      Cervical back: Normal range of motion.   Neurological:      Mental Status: She is alert.        Result Review :   The following data was reviewed by: JAMESON Price on 06/14/2022:  Common labs    Common Labs 4/8/22 4/8/22 10/27/22 10/27/22 10/27/22    0905 0905 0941 0941 0941   Glucose 143 (A)  157 (A)     BUN 12  13     Creatinine 0.7  0.8     Sodium 140  139     Potassium 4.6  4.4     Chloride 104  103     Calcium 9.4  9.0     Albumin   4.0     Total Bilirubin   0.35     Alkaline Phosphatase   88     AST (SGOT)   13     ALT (SGPT)   12     Total Cholesterol    247 (A)    Triglycerides    231 (A)    HDL Cholesterol    31    LDL Cholesterol     170    Hemoglobin A1C  7.8 (A)   7.4 (A)   (A) Abnormal value       Comments are available for some flowsheets but are not being displayed.                       Assessment and Plan    Diagnoses and all orders for this " visit:    1. Diabetic polyneuropathy associated with type 2 diabetes mellitus (HCC) (Primary)  -     CBC & Differential  -     Comprehensive Metabolic Panel  -     Hemoglobin A1c  -     Lipid Panel  -     Microalbumin / Creatinine Urine Ratio - Urine, Clean Catch  -     TSH  -     Vitamin B12    2. Type 2 diabetes mellitus with hyperglycemia, with long-term current use of insulin (HCC)  -     CBC & Differential  -     Comprehensive Metabolic Panel  -     Hemoglobin A1c  -     Lipid Panel  -     Microalbumin / Creatinine Urine Ratio - Urine, Clean Catch  -     TSH  -     Vitamin B12    3. Mixed hyperlipidemia  -     CBC & Differential  -     Comprehensive Metabolic Panel  -     Hemoglobin A1c  -     Lipid Panel  -     Microalbumin / Creatinine Urine Ratio - Urine, Clean Catch  -     TSH  -     Vitamin B12    4. Vitamin D deficiency  -     CBC & Differential  -     Comprehensive Metabolic Panel  -     Hemoglobin A1c  -     Lipid Panel  -     Microalbumin / Creatinine Urine Ratio - Urine, Clean Catch  -     TSH  -     Vitamin B12    5. Smoking    Other orders  -     Tirzepatide (Mounjaro) 2.5 MG/0.5ML solution pen-injector; Inject 0.5 mL under the skin into the appropriate area as directed Every 7 (Seven) Days for 4 doses.  Dispense: 2 mL; Refill: 11  -     ondansetron ODT (ZOFRAN-ODT) 4 MG disintegrating tablet; Place 1 tablet on the tongue Every 8 (Eight) Hours As Needed for Nausea or Vomiting.  Dispense: 20 tablet; Refill: 11           Glycemic Management:     Diabetes mellitus type 2     Lab Results   Component Value Date    HGBA1C 7.4 (H) 10/27/2022          Dexcom G6            Ambulatory Glucose Profile Report    Days Analyzed : 2 week period ending on 03/28/23      Continuous Glucose Monitory Device:  Dexcom G6     - 2% very high target range  - 31% high target range  - 67% in target range  - 0% below target range  - GMI 7.3 %  - Average glucose 168 mg/dl    Interpretation : Diabetes Type 2         High with  meals     But otherwise at goal     Will change Trulicity to Mounjaro         Trulicity not effective --      Start Mounjaro 2.5 mg once weekly -- increase to 5 mg once weekly          Taking  jardiance 10 mg one daily       Side effects  Discussed            Metformin 500 mg BID            Taking Humulin U 500     Take 35  units 30 minutes before breakfast---   Decrease to 25 units      If you have a low during the day decrease by 10 units      Take 45   units 30 minutes before suppers --- decrease to 35 untis      If you have a low over night decrease the supper dose by 10 units     Call if you have low sugars         Keep you Admelog for rescue---only used once      Sliding scale      200-250           Give 3 units   251-300           Give 4 units   301 to 350       Give 5 units  Above 351       Give 6 units               Taking Trulicity 0.75 mg one weekly -not effective               Side effects can be nausea     If nauseated while eating , the medication is saying stop eating      If vomiting or abdominal pain stop medication         Previous regimen          Taking Admelog 60 up to 70 units TID before each meal --stop     Microvascular Complications Monitoring         Last eye exam----- Dec. 2021, no DR      Neuropathy --yes      Taking gabapentin 400 mg tid            Lipid Management:      Not on statin      Total Cholesterol   Date Value Ref Range Status   05/08/2019 183 100 - 200 mg/dL Final     Triglycerides   Date Value Ref Range Status   10/27/2022 231 (H) 10 - 150 mg/dL Final     HDL Cholesterol   Date Value Ref Range Status   10/27/2022 31 23 - 92 mg/dL Final     LDL Cholesterol    Date Value Ref Range Status   10/27/2022 170 mg/dL Final     Comment:         OPTIMAL: <100 mg/dl  LOW RISK: 100-129 mg/dl  BORDERLINE HIGH: 130-159 mg/dl  HIGH: 160-189 mg/dl  VERY HIGH: >189 mg/dl           Blood Pressure Management:        Taking Hyzaar 50-12.5 mg daily            Thyroid Health     Lab Results    Component Value Date    TSH 1.210 07/02/2021             Bone Health                Lab Results   Component Value Date     CALCIUM 9.1 07/02/2021                  Weight Management:        Obesity      Body mass index is 38.42 kg/m².               Preventive Care:      Smoker     Hiren Christianson  reports that she has been smoking cigarettes. She has been smoking an average of 1 pack per day. She has never used smokeless tobacco.. I have educated her on the risk of diseases from using tobacco products such as cancer, COPD and heart disease.     I advised her to quit and she is not willing to quit.    I spent 3  minutes counseling the patient.                                                Follow Up   Return in about 3 months (around 6/28/2023).  Patient was given instructions and counseling regarding her condition or for health maintenance advice. Please see specific information pulled into the AVS if appropriate.         This document has been electronically signed by JAMESON Price on March 28, 2023 11:50 CDT.

## 2023-03-29 ENCOUNTER — DOCUMENTATION (OUTPATIENT)
Dept: ENDOCRINOLOGY | Facility: CLINIC | Age: 58
End: 2023-03-29
Payer: COMMERCIAL

## 2023-03-29 NOTE — PROGRESS NOTES
PA FOR MOUNJARO 2.5 MG SUBMITTED VIA [facility] [facility].    MOUNJARO 2.5 APPROVED FROM 03/29/2023 TO 03/28/2024

## 2023-04-27 ENCOUNTER — DOCUMENTATION (OUTPATIENT)
Dept: ENDOCRINOLOGY | Facility: CLINIC | Age: 58
End: 2023-04-27
Payer: COMMERCIAL

## 2023-04-27 RX ORDER — PROCHLORPERAZINE 25 MG/1
1 SUPPOSITORY RECTAL
Qty: 1 EACH | Refills: 3 | Status: SHIPPED | OUTPATIENT
Start: 2023-04-27

## 2023-04-27 NOTE — PROGRESS NOTES
PA FOR DEXCOM G6 TRANSMITTER SUBMITTED VIA [facility].    PER CMM: Member should be able to get the drug/product without a PA at this time

## 2023-05-02 ENCOUNTER — TELEPHONE (OUTPATIENT)
Dept: ENDOCRINOLOGY | Facility: CLINIC | Age: 58
End: 2023-05-02
Payer: COMMERCIAL

## 2023-05-02 DIAGNOSIS — Z79.4 TYPE 2 DIABETES MELLITUS WITH HYPERGLYCEMIA, WITH LONG-TERM CURRENT USE OF INSULIN: Primary | ICD-10-CM

## 2023-05-02 DIAGNOSIS — E11.65 TYPE 2 DIABETES MELLITUS WITH HYPERGLYCEMIA, WITH LONG-TERM CURRENT USE OF INSULIN: Primary | ICD-10-CM

## 2023-05-02 RX ORDER — TIRZEPATIDE 5 MG/.5ML
5 INJECTION, SOLUTION SUBCUTANEOUS WEEKLY
Qty: 2 ML | Refills: 1 | Status: SHIPPED | OUTPATIENT
Start: 2023-05-02

## 2023-05-02 NOTE — TELEPHONE ENCOUNTER
Pt called, asking if Demar would like for her to increase her Mounjaro to 5mg.     Please advise.  [phone]

## 2023-05-04 RX ORDER — PROCHLORPERAZINE 25 MG/1
SUPPOSITORY RECTAL
Qty: 9 EACH | Refills: 3 | Status: SHIPPED | OUTPATIENT
Start: 2023-05-04

## 2023-05-24 DIAGNOSIS — E11.65 TYPE 2 DIABETES MELLITUS WITH HYPERGLYCEMIA, WITH LONG-TERM CURRENT USE OF INSULIN: ICD-10-CM

## 2023-05-24 DIAGNOSIS — Z79.4 TYPE 2 DIABETES MELLITUS WITH HYPERGLYCEMIA, WITH LONG-TERM CURRENT USE OF INSULIN: ICD-10-CM

## 2023-05-24 RX ORDER — TIRZEPATIDE 5 MG/.5ML
INJECTION, SOLUTION SUBCUTANEOUS
Qty: 2 ML | Refills: 1 | Status: SHIPPED | OUTPATIENT
Start: 2023-05-24

## 2023-07-26 ENCOUNTER — DOCUMENTATION (OUTPATIENT)
Dept: ENDOCRINOLOGY | Facility: CLINIC | Age: 58
End: 2023-07-26
Payer: COMMERCIAL

## 2023-07-27 ENCOUNTER — DOCUMENTATION (OUTPATIENT)
Dept: ENDOCRINOLOGY | Facility: CLINIC | Age: 58
End: 2023-07-27
Payer: COMMERCIAL

## 2023-07-27 NOTE — PROGRESS NOTES
PER [facility], PATIENT SHOULD BE ABLE TO GET DEXCOM G6 SENSOR AT THIS TIME WITHOUT AUTHORIZATION.

## 2023-08-10 ENCOUNTER — TELEPHONE (OUTPATIENT)
Dept: ENDOCRINOLOGY | Facility: CLINIC | Age: 58
End: 2023-08-10

## 2023-08-10 NOTE — TELEPHONE ENCOUNTER
Pharmacy said that she needs PA on her Dexcom sensors and her Mounjaro. She talked to the pharmacy this morning.

## (undated) DEVICE — SOL IRR H2O BTL 1000ML STRL

## (undated) DEVICE — GLV SURG NEOLON 2G PF LF 6.5 STRL

## (undated) DEVICE — Device

## (undated) DEVICE — GLV SURG TRIUMPH LT PF LTX 7.5 STRL

## (undated) DEVICE — GLV SURG SENSICARE POLYISPRN W/ALOE PF LF 6 GRN STRL

## (undated) DEVICE — GLV SURG SENSICARE POLYISPRN W/ALOE PF LF 6.5 GRN STRL